# Patient Record
Sex: FEMALE | Race: WHITE | NOT HISPANIC OR LATINO | Employment: OTHER | URBAN - NONMETROPOLITAN AREA
[De-identification: names, ages, dates, MRNs, and addresses within clinical notes are randomized per-mention and may not be internally consistent; named-entity substitution may affect disease eponyms.]

---

## 2017-01-01 ENCOUNTER — OFFICE VISIT (OUTPATIENT)
Dept: MEDICAL GROUP | Facility: PHYSICIAN GROUP | Age: 82
End: 2017-01-01
Payer: MEDICARE

## 2017-01-01 ENCOUNTER — TELEPHONE (OUTPATIENT)
Dept: MEDICAL GROUP | Facility: PHYSICIAN GROUP | Age: 82
End: 2017-01-01

## 2017-01-01 ENCOUNTER — APPOINTMENT (OUTPATIENT)
Dept: RADIOLOGY | Facility: MEDICAL CENTER | Age: 82
End: 2017-01-01
Attending: EMERGENCY MEDICINE
Payer: MEDICARE

## 2017-01-01 ENCOUNTER — HOSPITAL ENCOUNTER (EMERGENCY)
Facility: MEDICAL CENTER | Age: 82
End: 2017-07-11
Attending: EMERGENCY MEDICINE
Payer: MEDICARE

## 2017-01-01 ENCOUNTER — HOSPITAL ENCOUNTER (OUTPATIENT)
Facility: MEDICAL CENTER | Age: 82
End: 2017-07-09
Attending: NURSE PRACTITIONER
Payer: MEDICARE

## 2017-01-01 ENCOUNTER — HOSPITAL ENCOUNTER (OUTPATIENT)
Dept: LAB | Facility: MEDICAL CENTER | Age: 82
End: 2017-02-21
Attending: INTERNAL MEDICINE
Payer: MEDICARE

## 2017-01-01 ENCOUNTER — HOSPITAL ENCOUNTER (INPATIENT)
Facility: MEDICAL CENTER | Age: 82
LOS: 4 days | DRG: 177 | End: 2017-05-13
Attending: EMERGENCY MEDICINE | Admitting: HOSPITALIST
Payer: MEDICARE

## 2017-01-01 ENCOUNTER — APPOINTMENT (OUTPATIENT)
Dept: RADIOLOGY | Facility: MEDICAL CENTER | Age: 82
DRG: 177 | End: 2017-01-01
Attending: EMERGENCY MEDICINE
Payer: MEDICARE

## 2017-01-01 ENCOUNTER — RESOLUTE PROFESSIONAL BILLING HOSPITAL PROF FEE (OUTPATIENT)
Dept: HOSPITALIST | Facility: MEDICAL CENTER | Age: 82
End: 2017-01-01
Payer: MEDICARE

## 2017-01-01 VITALS
DIASTOLIC BLOOD PRESSURE: 78 MMHG | TEMPERATURE: 99.1 F | HEART RATE: 101 BPM | RESPIRATION RATE: 14 BRPM | BODY MASS INDEX: 19.56 KG/M2 | SYSTOLIC BLOOD PRESSURE: 110 MMHG | WEIGHT: 97 LBS | OXYGEN SATURATION: 91 % | HEIGHT: 59 IN

## 2017-01-01 VITALS
HEART RATE: 98 BPM | DIASTOLIC BLOOD PRESSURE: 62 MMHG | BODY MASS INDEX: 19.15 KG/M2 | OXYGEN SATURATION: 98 % | TEMPERATURE: 97.3 F | HEIGHT: 59 IN | SYSTOLIC BLOOD PRESSURE: 102 MMHG | RESPIRATION RATE: 20 BRPM | WEIGHT: 95 LBS

## 2017-01-01 VITALS
WEIGHT: 94 LBS | HEART RATE: 92 BPM | HEIGHT: 59 IN | TEMPERATURE: 97.9 F | SYSTOLIC BLOOD PRESSURE: 126 MMHG | BODY MASS INDEX: 18.95 KG/M2 | OXYGEN SATURATION: 96 % | RESPIRATION RATE: 12 BRPM | DIASTOLIC BLOOD PRESSURE: 68 MMHG

## 2017-01-01 VITALS
SYSTOLIC BLOOD PRESSURE: 116 MMHG | WEIGHT: 80 LBS | DIASTOLIC BLOOD PRESSURE: 68 MMHG | BODY MASS INDEX: 16.13 KG/M2 | OXYGEN SATURATION: 94 % | RESPIRATION RATE: 19 BRPM | HEART RATE: 82 BPM | TEMPERATURE: 98 F | HEIGHT: 59 IN

## 2017-01-01 VITALS
BODY MASS INDEX: 19.56 KG/M2 | HEART RATE: 100 BPM | RESPIRATION RATE: 16 BRPM | TEMPERATURE: 97.9 F | WEIGHT: 97 LBS | HEIGHT: 59 IN | OXYGEN SATURATION: 95 %

## 2017-01-01 VITALS
HEART RATE: 91 BPM | HEIGHT: 59 IN | TEMPERATURE: 98.1 F | OXYGEN SATURATION: 99 % | DIASTOLIC BLOOD PRESSURE: 46 MMHG | RESPIRATION RATE: 13 BRPM | WEIGHT: 94 LBS | BODY MASS INDEX: 18.95 KG/M2 | SYSTOLIC BLOOD PRESSURE: 133 MMHG

## 2017-01-01 VITALS
SYSTOLIC BLOOD PRESSURE: 118 MMHG | DIASTOLIC BLOOD PRESSURE: 78 MMHG | TEMPERATURE: 97.8 F | OXYGEN SATURATION: 97 % | HEART RATE: 78 BPM | HEIGHT: 59 IN | BODY MASS INDEX: 19.64 KG/M2 | WEIGHT: 97.4 LBS

## 2017-01-01 DIAGNOSIS — J18.9 PNEUMONIA OF RIGHT UPPER LOBE DUE TO INFECTIOUS ORGANISM: ICD-10-CM

## 2017-01-01 DIAGNOSIS — D50.0 ANEMIA DUE TO CHRONIC BLOOD LOSS: ICD-10-CM

## 2017-01-01 DIAGNOSIS — G20.C PARKINSONIAN TREMOR: ICD-10-CM

## 2017-01-01 DIAGNOSIS — F03.91 DEMENTIA WITH BEHAVIORAL DISTURBANCE, UNSPECIFIED DEMENTIA TYPE: ICD-10-CM

## 2017-01-01 DIAGNOSIS — R45.1 AGITATION: ICD-10-CM

## 2017-01-01 DIAGNOSIS — J96.11 CHRONIC RESPIRATORY FAILURE WITH HYPOXIA (HCC): ICD-10-CM

## 2017-01-01 DIAGNOSIS — E78.00 PURE HYPERCHOLESTEROLEMIA: ICD-10-CM

## 2017-01-01 DIAGNOSIS — F02.80 ALZHEIMER'S DEMENTIA WITHOUT BEHAVIORAL DISTURBANCE, UNSPECIFIED TIMING OF DEMENTIA ONSET: ICD-10-CM

## 2017-01-01 DIAGNOSIS — R19.7 DIARRHEA, UNSPECIFIED TYPE: ICD-10-CM
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
## 2017-01-01 DIAGNOSIS — F03.90 DEMENTIA WITHOUT BEHAVIORAL DISTURBANCE, UNSPECIFIED DEMENTIA TYPE: ICD-10-CM

## 2017-01-01 DIAGNOSIS — K59.01 SLOW TRANSIT CONSTIPATION: ICD-10-CM

## 2017-01-01 DIAGNOSIS — M54.50 ACUTE MIDLINE LOW BACK PAIN WITHOUT SCIATICA: ICD-10-CM

## 2017-01-01 DIAGNOSIS — E86.0 DEHYDRATION: ICD-10-CM

## 2017-01-01 DIAGNOSIS — A04.72 C. DIFFICILE DIARRHEA: ICD-10-CM

## 2017-01-01 DIAGNOSIS — F41.9 ANXIETY: ICD-10-CM

## 2017-01-01 DIAGNOSIS — R30.0 DYSURIA: ICD-10-CM

## 2017-01-01 DIAGNOSIS — G20.A1 PARKINSON DISEASE: ICD-10-CM

## 2017-01-01 DIAGNOSIS — N28.9 RENAL INSUFFICIENCY: ICD-10-CM

## 2017-01-01 DIAGNOSIS — I10 ESSENTIAL HYPERTENSION: ICD-10-CM

## 2017-01-01 DIAGNOSIS — E78.5 DYSLIPIDEMIA: ICD-10-CM

## 2017-01-01 DIAGNOSIS — M79.641 PAIN OF RIGHT HAND: ICD-10-CM

## 2017-01-01 DIAGNOSIS — F51.01 PRIMARY INSOMNIA: ICD-10-CM

## 2017-01-01 DIAGNOSIS — R63.4 WEIGHT LOSS: ICD-10-CM

## 2017-01-01 DIAGNOSIS — G30.9 ALZHEIMER'S DEMENTIA WITHOUT BEHAVIORAL DISTURBANCE, UNSPECIFIED TIMING OF DEMENTIA ONSET: ICD-10-CM

## 2017-01-01 LAB
ALBUMIN SERPL BCP-MCNC: 2.8 G/DL (ref 3.2–4.9)
ALBUMIN SERPL BCP-MCNC: 2.8 G/DL (ref 3.2–4.9)
ALBUMIN SERPL BCP-MCNC: 3.7 G/DL (ref 3.2–4.9)
ALBUMIN/GLOB SERPL: 0.9 G/DL
ALBUMIN/GLOB SERPL: 1.1 G/DL
ALP SERPL-CCNC: 116 U/L (ref 30–99)
ALP SERPL-CCNC: 79 U/L (ref 30–99)
ALT SERPL-CCNC: 10 U/L (ref 2–50)
ALT SERPL-CCNC: 13 U/L (ref 2–50)
AMORPH CRY #/AREA URNS HPF: PRESENT /HPF
ANION GAP SERPL CALC-SCNC: 12 MMOL/L (ref 0–11.9)
ANION GAP SERPL CALC-SCNC: 7 MMOL/L (ref 0–11.9)
ANION GAP SERPL CALC-SCNC: 8 MMOL/L (ref 0–11.9)
ANION GAP SERPL CALC-SCNC: 9 MMOL/L (ref 0–11.9)
ANISOCYTOSIS BLD QL SMEAR: ABNORMAL
APPEARANCE UR: ABNORMAL
APTT PPP: 28.7 SEC (ref 24.7–36)
AST SERPL-CCNC: 12 U/L (ref 12–45)
AST SERPL-CCNC: 14 U/L (ref 12–45)
BACTERIA #/AREA URNS HPF: ABNORMAL /HPF
BACTERIA BLD CULT: NORMAL
BACTERIA BLD CULT: NORMAL
BACTERIA STL CULT: NORMAL
BACTERIA UR CULT: ABNORMAL
BACTERIA UR CULT: ABNORMAL
BASOPHILS # BLD AUTO: 0.07 K/UL (ref 0–0.12)
BASOPHILS # BLD AUTO: 0.2 % (ref 0–1.8)
BASOPHILS # BLD AUTO: 0.3 % (ref 0–1.8)
BASOPHILS # BLD AUTO: 0.4 % (ref 0–1.8)
BASOPHILS # BLD AUTO: 1.7 % (ref 0–1.8)
BASOPHILS # BLD: 0.02 K/UL (ref 0–0.12)
BASOPHILS # BLD: 0.02 K/UL (ref 0–0.12)
BASOPHILS # BLD: 0.03 K/UL (ref 0–0.12)
BASOPHILS # BLD: 0.1 K/UL (ref 0–0.12)
BASOPHILS NFR BLD AUTO: 1 % (ref 0–1.8)
BILIRUB SERPL-MCNC: 0.2 MG/DL (ref 0.1–1.5)
BILIRUB SERPL-MCNC: 0.4 MG/DL (ref 0.1–1.5)
BILIRUB UR QL STRIP.AUTO: NEGATIVE
BNP SERPL-MCNC: 43 PG/ML (ref 0–100)
BUN SERPL-MCNC: 17 MG/DL (ref 8–22)
BUN SERPL-MCNC: 31 MG/DL (ref 8–22)
BUN SERPL-MCNC: 41 MG/DL (ref 8–22)
BUN SERPL-MCNC: 43 MG/DL (ref 8–22)
BUN SERPL-MCNC: 74 MG/DL (ref 8–22)
C DIFF DNA SPEC QL NAA+PROBE: POSITIVE
C DIFF DNA SPEC QL NAA+PROBE: POSITIVE
C DIFF TOX A+B STL QL IA: POSITIVE
C DIFF TOX A+B STL QL IA: POSITIVE
C DIFF TOX GENS STL QL NAA+PROBE: ABNORMAL
C DIFF TOX GENS STL QL NAA+PROBE: ABNORMAL
CALCIUM SERPL-MCNC: 8.3 MG/DL (ref 8.5–10.5)
CALCIUM SERPL-MCNC: 9.1 MG/DL (ref 8.5–10.5)
CALCIUM SERPL-MCNC: 9.5 MG/DL (ref 8.5–10.5)
CALCIUM SERPL-MCNC: 9.7 MG/DL (ref 8.5–10.5)
CALCIUM SERPL-MCNC: 9.9 MG/DL (ref 8.5–10.5)
CHLORIDE SERPL-SCNC: 102 MMOL/L (ref 96–112)
CHLORIDE SERPL-SCNC: 104 MMOL/L (ref 96–112)
CHLORIDE SERPL-SCNC: 105 MMOL/L (ref 96–112)
CHLORIDE SERPL-SCNC: 116 MMOL/L (ref 96–112)
CHLORIDE SERPL-SCNC: 117 MMOL/L (ref 96–112)
CO2 SERPL-SCNC: 17 MMOL/L (ref 20–33)
CO2 SERPL-SCNC: 20 MMOL/L (ref 20–33)
CO2 SERPL-SCNC: 21 MMOL/L (ref 20–33)
CO2 SERPL-SCNC: 23 MMOL/L (ref 20–33)
CO2 SERPL-SCNC: 26 MMOL/L (ref 20–33)
COLOR UR: YELLOW
CREAT SERPL-MCNC: 0.77 MG/DL (ref 0.5–1.4)
CREAT SERPL-MCNC: 1.09 MG/DL (ref 0.5–1.4)
CREAT SERPL-MCNC: 1.2 MG/DL (ref 0.5–1.4)
CREAT SERPL-MCNC: 1.39 MG/DL (ref 0.5–1.4)
CREAT SERPL-MCNC: 2.72 MG/DL (ref 0.5–1.4)
CULTURE IF INDICATED INDCX: YES UA CULTURE
E COLI SXT1+2 STL IA: NORMAL
E COLI SXT1+2 STL IA: NORMAL
EKG IMPRESSION: NORMAL
EOSINOPHIL # BLD AUTO: 0.01 K/UL (ref 0–0.51)
EOSINOPHIL # BLD AUTO: 0.05 K/UL (ref 0–0.51)
EOSINOPHIL # BLD AUTO: 0.15 K/UL (ref 0–0.51)
EOSINOPHIL # BLD AUTO: 0.19 K/UL (ref 0–0.51)
EOSINOPHIL # BLD: 0.27 K/UL (ref 0–0.51)
EOSINOPHIL NFR BLD AUTO: 3.9 % (ref 0–6.9)
EOSINOPHIL NFR BLD: 0.1 % (ref 0–6.9)
EOSINOPHIL NFR BLD: 0.9 % (ref 0–6.9)
EOSINOPHIL NFR BLD: 1.6 % (ref 0–6.9)
EOSINOPHIL NFR BLD: 4.2 % (ref 0–6.9)
ERYTHROCYTE [DISTWIDTH] IN BLOOD BY AUTOMATED COUNT: 45.9 FL (ref 35.9–50)
ERYTHROCYTE [DISTWIDTH] IN BLOOD BY AUTOMATED COUNT: 55.1 FL (ref 35.9–50)
ERYTHROCYTE [DISTWIDTH] IN BLOOD BY AUTOMATED COUNT: 55.9 FL (ref 35.9–50)
ERYTHROCYTE [DISTWIDTH] IN BLOOD BY AUTOMATED COUNT: 58.4 FL (ref 35.9–50)
ERYTHROCYTE [DISTWIDTH] IN BLOOD BY AUTOMATED COUNT: 61.6 FL (ref 35.9–50)
GFR SERPL CREATININE-BSD FRML MDRD: 17 ML/MIN/1.73 M 2
GFR SERPL CREATININE-BSD FRML MDRD: 37 ML/MIN/1.73 M 2
GFR SERPL CREATININE-BSD FRML MDRD: 48 ML/MIN/1.73 M 2
GFR SERPL CREATININE-BSD FRML MDRD: >60 ML/MIN/1.73 M 2
GLOBULIN SER CALC-MCNC: 3 G/DL (ref 1.9–3.5)
GLOBULIN SER CALC-MCNC: 3.5 G/DL (ref 1.9–3.5)
GLUCOSE SERPL-MCNC: 162 MG/DL (ref 65–99)
GLUCOSE SERPL-MCNC: 164 MG/DL (ref 65–99)
GLUCOSE SERPL-MCNC: 76 MG/DL (ref 65–99)
GLUCOSE SERPL-MCNC: 91 MG/DL (ref 65–99)
GLUCOSE SERPL-MCNC: 95 MG/DL (ref 65–99)
GLUCOSE UR STRIP.AUTO-MCNC: NEGATIVE MG/DL
HCT VFR BLD AUTO: 25.8 % (ref 37–47)
HCT VFR BLD AUTO: 27.4 % (ref 37–47)
HCT VFR BLD AUTO: 29.6 % (ref 37–47)
HCT VFR BLD AUTO: 31.4 % (ref 37–47)
HCT VFR BLD AUTO: 36.1 % (ref 37–47)
HGB BLD-MCNC: 10.1 G/DL (ref 12–16)
HGB BLD-MCNC: 11 G/DL (ref 12–16)
HGB BLD-MCNC: 7.7 G/DL (ref 12–16)
HGB BLD-MCNC: 8.2 G/DL (ref 12–16)
HGB BLD-MCNC: 9.2 G/DL (ref 12–16)
HYALINE CASTS #/AREA URNS LPF: ABNORMAL /LPF
IMM GRANULOCYTES # BLD AUTO: 0.01 K/UL (ref 0–0.11)
IMM GRANULOCYTES # BLD AUTO: 0.02 K/UL (ref 0–0.11)
IMM GRANULOCYTES # BLD AUTO: 0.02 K/UL (ref 0–0.11)
IMM GRANULOCYTES # BLD AUTO: 0.16 K/UL (ref 0–0.11)
IMM GRANULOCYTES NFR BLD AUTO: 0.1 % (ref 0–0.9)
IMM GRANULOCYTES NFR BLD AUTO: 0.3 % (ref 0–0.9)
IMM GRANULOCYTES NFR BLD AUTO: 0.4 % (ref 0–0.9)
IMM GRANULOCYTES NFR BLD AUTO: 1.7 % (ref 0–0.9)
INR PPP: 1.1 (ref 0.87–1.13)
KETONES UR STRIP.AUTO-MCNC: NEGATIVE MG/DL
LACTATE BLD-SCNC: 1.3 MMOL/L (ref 0.5–2)
LEUKOCYTE ESTERASE UR QL STRIP.AUTO: ABNORMAL
LYMPHOCYTES # BLD AUTO: 0.55 K/UL (ref 1–4.8)
LYMPHOCYTES # BLD AUTO: 0.75 K/UL (ref 1–4.8)
LYMPHOCYTES # BLD AUTO: 0.89 K/UL (ref 1–4.8)
LYMPHOCYTES # BLD AUTO: 1.7 K/UL (ref 1–4.8)
LYMPHOCYTES # BLD: 1.96 K/UL (ref 1–4.8)
LYMPHOCYTES NFR BLD AUTO: 28.5 % (ref 22–41)
LYMPHOCYTES NFR BLD: 18.5 % (ref 22–41)
LYMPHOCYTES NFR BLD: 19.6 % (ref 22–41)
LYMPHOCYTES NFR BLD: 8.3 % (ref 22–41)
LYMPHOCYTES NFR BLD: 9.6 % (ref 22–41)
MANUAL DIFF BLD: NORMAL
MCH RBC QN AUTO: 27.4 PG (ref 27–33)
MCH RBC QN AUTO: 29.3 PG (ref 27–33)
MCH RBC QN AUTO: 29.5 PG (ref 27–33)
MCH RBC QN AUTO: 29.9 PG (ref 27–33)
MCH RBC QN AUTO: 30.3 PG (ref 27–33)
MCHC RBC AUTO-ENTMCNC: 29.8 G/DL (ref 33.6–35)
MCHC RBC AUTO-ENTMCNC: 29.9 G/DL (ref 33.6–35)
MCHC RBC AUTO-ENTMCNC: 30.5 G/DL (ref 33.6–35)
MCHC RBC AUTO-ENTMCNC: 31.1 G/DL (ref 33.6–35)
MCHC RBC AUTO-ENTMCNC: 32.2 G/DL (ref 33.6–35)
MCV RBC AUTO: 100 FL (ref 81.4–97.8)
MCV RBC AUTO: 90 FL (ref 81.4–97.8)
MCV RBC AUTO: 94.3 FL (ref 81.4–97.8)
MCV RBC AUTO: 94.3 FL (ref 81.4–97.8)
MCV RBC AUTO: 98.9 FL (ref 81.4–97.8)
MICRO URNS: ABNORMAL
MICROCYTES BLD QL SMEAR: ABNORMAL
MONOCYTES # BLD AUTO: 0.1 K/UL (ref 0–0.85)
MONOCYTES # BLD AUTO: 0.45 K/UL (ref 0–0.85)
MONOCYTES # BLD AUTO: 0.51 K/UL (ref 0–0.85)
MONOCYTES # BLD AUTO: 0.62 K/UL (ref 0–0.85)
MONOCYTES # BLD: 0.45 K/UL (ref 0–0.85)
MONOCYTES NFR BLD AUTO: 1.7 % (ref 0–13.4)
MONOCYTES NFR BLD AUTO: 11.2 % (ref 0–13.4)
MONOCYTES NFR BLD AUTO: 4.9 % (ref 0–13.4)
MONOCYTES NFR BLD AUTO: 6.5 % (ref 0–13.4)
MONOCYTES NFR BLD AUTO: 6.9 % (ref 0–13.4)
MORPHOLOGY BLD-IMP: NORMAL
NEUTROPHILS # BLD AUTO: 2.91 K/UL (ref 2–7.15)
NEUTROPHILS # BLD AUTO: 4.91 K/UL (ref 2–7.15)
NEUTROPHILS # BLD AUTO: 6.72 K/UL (ref 2–7.15)
NEUTROPHILS # BLD AUTO: 7.58 K/UL (ref 2–7.15)
NEUTROPHILS # BLD: 4.11 K/UL (ref 2–7.15)
NEUTROPHILS NFR BLD AUTO: 59.8 % (ref 44–72)
NEUTROPHILS NFR BLD: 64.2 % (ref 44–72)
NEUTROPHILS NFR BLD: 73.1 % (ref 44–72)
NEUTROPHILS NFR BLD: 84.3 % (ref 44–72)
NEUTROPHILS NFR BLD: 85.2 % (ref 44–72)
NEUTS BAND NFR BLD MANUAL: 0.9 % (ref 0–10)
NITRITE UR QL STRIP.AUTO: NEGATIVE
NRBC # BLD AUTO: 0 K/UL
NRBC BLD AUTO-RTO: 0 /100 WBC
NRBC BLD-RTO: 0 /100 WBC
OVALOCYTES BLD QL SMEAR: NORMAL
PH UR STRIP.AUTO: 5.5 [PH]
PHOSPHATE SERPL-MCNC: 2.9 MG/DL (ref 2.5–4.5)
PLATELET # BLD AUTO: 328 K/UL (ref 164–446)
PLATELET # BLD AUTO: 341 K/UL (ref 164–446)
PLATELET # BLD AUTO: 392 K/UL (ref 164–446)
PLATELET # BLD AUTO: 455 K/UL (ref 164–446)
PLATELET # BLD AUTO: 486 K/UL (ref 164–446)
PLATELET BLD QL SMEAR: NORMAL
PMV BLD AUTO: 8.9 FL (ref 9–12.9)
PMV BLD AUTO: 9.1 FL (ref 9–12.9)
PMV BLD AUTO: 9.1 FL (ref 9–12.9)
PMV BLD AUTO: 9.2 FL (ref 9–12.9)
PMV BLD AUTO: 9.9 FL (ref 9–12.9)
POIKILOCYTOSIS BLD QL SMEAR: NORMAL
POTASSIUM SERPL-SCNC: 3.8 MMOL/L (ref 3.6–5.5)
POTASSIUM SERPL-SCNC: 4.1 MMOL/L (ref 3.6–5.5)
POTASSIUM SERPL-SCNC: 4.3 MMOL/L (ref 3.6–5.5)
POTASSIUM SERPL-SCNC: 4.8 MMOL/L (ref 3.6–5.5)
POTASSIUM SERPL-SCNC: 5.3 MMOL/L (ref 3.6–5.5)
PROT SERPL-MCNC: 5.8 G/DL (ref 6–8.2)
PROT SERPL-MCNC: 7.2 G/DL (ref 6–8.2)
PROT UR QL STRIP: NEGATIVE MG/DL
PROTHROMBIN TIME: 14.6 SEC (ref 12–14.6)
RBC # BLD AUTO: 2.61 M/UL (ref 4.2–5.4)
RBC # BLD AUTO: 2.74 M/UL (ref 4.2–5.4)
RBC # BLD AUTO: 3.14 M/UL (ref 4.2–5.4)
RBC # BLD AUTO: 3.33 M/UL (ref 4.2–5.4)
RBC # BLD AUTO: 4.01 M/UL (ref 4.2–5.4)
RBC BLD AUTO: PRESENT
RBC UR QL AUTO: NEGATIVE
SIGNIFICANT IND 70042: ABNORMAL
SIGNIFICANT IND 70042: NORMAL
SITE SITE: ABNORMAL
SITE SITE: NORMAL
SODIUM SERPL-SCNC: 134 MMOL/L (ref 135–145)
SODIUM SERPL-SCNC: 138 MMOL/L (ref 135–145)
SODIUM SERPL-SCNC: 138 MMOL/L (ref 135–145)
SODIUM SERPL-SCNC: 143 MMOL/L (ref 135–145)
SODIUM SERPL-SCNC: 145 MMOL/L (ref 135–145)
SOURCE SOURCE: ABNORMAL
SOURCE SOURCE: NORMAL
SP GR UR STRIP.AUTO: 1.01
T4 FREE SERPL-MCNC: 1.06 NG/DL (ref 0.53–1.43)
TROPONIN I SERPL-MCNC: <0.01 NG/ML (ref 0–0.04)
TSH SERPL DL<=0.005 MIU/L-ACNC: 4.05 UIU/ML (ref 0.3–3.7)
WBC # BLD AUTO: 4.5 K/UL (ref 4.8–10.8)
WBC # BLD AUTO: 5.7 K/UL (ref 4.8–10.8)
WBC # BLD AUTO: 6.9 K/UL (ref 4.8–10.8)
WBC # BLD AUTO: 9 K/UL (ref 4.8–10.8)
WBC # BLD AUTO: 9.2 K/UL (ref 4.8–10.8)
WBC #/AREA URNS HPF: ABNORMAL /HPF

## 2017-01-01 PROCEDURE — 304562 HCHG STAT O2 MASK/CANNULA

## 2017-01-01 PROCEDURE — G8484 FLU IMMUNIZE NO ADMIN: HCPCS | Performed by: NURSE PRACTITIONER

## 2017-01-01 PROCEDURE — G8419 CALC BMI OUT NRM PARAM NOF/U: HCPCS | Performed by: NURSE PRACTITIONER

## 2017-01-01 PROCEDURE — 71010 DX-CHEST-PORTABLE (1 VIEW): CPT

## 2017-01-01 PROCEDURE — 770006 HCHG ROOM/CARE - MED/SURG/GYN SEMI*

## 2017-01-01 PROCEDURE — 700102 HCHG RX REV CODE 250 W/ 637 OVERRIDE(OP): Performed by: HOSPITALIST

## 2017-01-01 PROCEDURE — 87186 SC STD MICRODIL/AGAR DIL: CPT

## 2017-01-01 PROCEDURE — 99214 OFFICE O/P EST MOD 30 MIN: CPT | Performed by: NURSE PRACTITIONER

## 2017-01-01 PROCEDURE — 700111 HCHG RX REV CODE 636 W/ 250 OVERRIDE (IP): Performed by: INTERNAL MEDICINE

## 2017-01-01 PROCEDURE — 1036F TOBACCO NON-USER: CPT | Performed by: NURSE PRACTITIONER

## 2017-01-01 PROCEDURE — A9270 NON-COVERED ITEM OR SERVICE: HCPCS | Performed by: HOSPITALIST

## 2017-01-01 PROCEDURE — 85025 COMPLETE CBC W/AUTO DIFF WBC: CPT

## 2017-01-01 PROCEDURE — 99239 HOSP IP/OBS DSCHRG MGMT >30: CPT | Performed by: INTERNAL MEDICINE

## 2017-01-01 PROCEDURE — 87077 CULTURE AEROBIC IDENTIFY: CPT

## 2017-01-01 PROCEDURE — 4040F PNEUMOC VAC/ADMIN/RCVD: CPT | Performed by: INTERNAL MEDICINE

## 2017-01-01 PROCEDURE — 85610 PROTHROMBIN TIME: CPT

## 2017-01-01 PROCEDURE — 700105 HCHG RX REV CODE 258: Performed by: INTERNAL MEDICINE

## 2017-01-01 PROCEDURE — 99285 EMERGENCY DEPT VISIT HI MDM: CPT

## 2017-01-01 PROCEDURE — 87493 C DIFF AMPLIFIED PROBE: CPT

## 2017-01-01 PROCEDURE — G8419 CALC BMI OUT NRM PARAM NOF/U: HCPCS | Performed by: INTERNAL MEDICINE

## 2017-01-01 PROCEDURE — G8997 SWALLOW GOAL STATUS: HCPCS | Mod: CH

## 2017-01-01 PROCEDURE — 36415 COLL VENOUS BLD VENIPUNCTURE: CPT

## 2017-01-01 PROCEDURE — 1101F PT FALLS ASSESS-DOCD LE1/YR: CPT | Performed by: INTERNAL MEDICINE

## 2017-01-01 PROCEDURE — 700111 HCHG RX REV CODE 636 W/ 250 OVERRIDE (IP): Performed by: HOSPITALIST

## 2017-01-01 PROCEDURE — 700105 HCHG RX REV CODE 258: Performed by: HOSPITALIST

## 2017-01-01 PROCEDURE — 87045 FECES CULTURE AEROBIC BACT: CPT

## 2017-01-01 PROCEDURE — 700105 HCHG RX REV CODE 258: Performed by: EMERGENCY MEDICINE

## 2017-01-01 PROCEDURE — 99497 ADVNCD CARE PLAN 30 MIN: CPT | Performed by: INTERNAL MEDICINE

## 2017-01-01 PROCEDURE — G8484 FLU IMMUNIZE NO ADMIN: HCPCS | Performed by: INTERNAL MEDICINE

## 2017-01-01 PROCEDURE — 99214 OFFICE O/P EST MOD 30 MIN: CPT | Performed by: FAMILY MEDICINE

## 2017-01-01 PROCEDURE — 92610 EVALUATE SWALLOWING FUNCTION: CPT

## 2017-01-01 PROCEDURE — 87046 STOOL CULTR AEROBIC BACT EA: CPT

## 2017-01-01 PROCEDURE — 4040F PNEUMOC VAC/ADMIN/RCVD: CPT | Performed by: NURSE PRACTITIONER

## 2017-01-01 PROCEDURE — 99356 PR PROLONGED SVC I/P OR OBS SETTING 1ST HOUR: CPT | Performed by: INTERNAL MEDICINE

## 2017-01-01 PROCEDURE — 99214 OFFICE O/P EST MOD 30 MIN: CPT | Performed by: INTERNAL MEDICINE

## 2017-01-01 PROCEDURE — 87040 BLOOD CULTURE FOR BACTERIA: CPT

## 2017-01-01 PROCEDURE — 80048 BASIC METABOLIC PNL TOTAL CA: CPT

## 2017-01-01 PROCEDURE — G8996 SWALLOW CURRENT STATUS: HCPCS | Mod: CL

## 2017-01-01 PROCEDURE — 99233 SBSQ HOSP IP/OBS HIGH 50: CPT | Performed by: INTERNAL MEDICINE

## 2017-01-01 PROCEDURE — 96361 HYDRATE IV INFUSION ADD-ON: CPT

## 2017-01-01 PROCEDURE — 1101F PT FALLS ASSESS-DOCD LE1/YR: CPT | Performed by: NURSE PRACTITIONER

## 2017-01-01 PROCEDURE — 99213 OFFICE O/P EST LOW 20 MIN: CPT | Performed by: NURSE PRACTITIONER

## 2017-01-01 PROCEDURE — G8432 DEP SCR NOT DOC, RNG: HCPCS | Performed by: NURSE PRACTITIONER

## 2017-01-01 PROCEDURE — A9270 NON-COVERED ITEM OR SERVICE: HCPCS | Performed by: EMERGENCY MEDICINE

## 2017-01-01 PROCEDURE — 700111 HCHG RX REV CODE 636 W/ 250 OVERRIDE (IP): Performed by: EMERGENCY MEDICINE

## 2017-01-01 PROCEDURE — 80069 RENAL FUNCTION PANEL: CPT

## 2017-01-01 PROCEDURE — 87086 URINE CULTURE/COLONY COUNT: CPT

## 2017-01-01 PROCEDURE — 72100 X-RAY EXAM L-S SPINE 2/3 VWS: CPT

## 2017-01-01 PROCEDURE — 85730 THROMBOPLASTIN TIME PARTIAL: CPT

## 2017-01-01 PROCEDURE — 81001 URINALYSIS AUTO W/SCOPE: CPT

## 2017-01-01 PROCEDURE — 1036F TOBACCO NON-USER: CPT | Performed by: FAMILY MEDICINE

## 2017-01-01 PROCEDURE — 99215 OFFICE O/P EST HI 40 MIN: CPT | Performed by: INTERNAL MEDICINE

## 2017-01-01 PROCEDURE — 80053 COMPREHEN METABOLIC PANEL: CPT

## 2017-01-01 PROCEDURE — 84484 ASSAY OF TROPONIN QUANT: CPT

## 2017-01-01 PROCEDURE — 83880 ASSAY OF NATRIURETIC PEPTIDE: CPT

## 2017-01-01 PROCEDURE — 96360 HYDRATION IV INFUSION INIT: CPT

## 2017-01-01 PROCEDURE — G8420 CALC BMI NORM PARAMETERS: HCPCS | Performed by: FAMILY MEDICINE

## 2017-01-01 PROCEDURE — 99223 1ST HOSP IP/OBS HIGH 75: CPT | Mod: AI | Performed by: HOSPITALIST

## 2017-01-01 PROCEDURE — 92526 ORAL FUNCTION THERAPY: CPT

## 2017-01-01 PROCEDURE — 84439 ASSAY OF FREE THYROXINE: CPT

## 2017-01-01 PROCEDURE — 85027 COMPLETE CBC AUTOMATED: CPT

## 2017-01-01 PROCEDURE — 1101F PT FALLS ASSESS-DOCD LE1/YR: CPT | Performed by: FAMILY MEDICINE

## 2017-01-01 PROCEDURE — 93005 ELECTROCARDIOGRAM TRACING: CPT | Performed by: EMERGENCY MEDICINE

## 2017-01-01 PROCEDURE — G8432 DEP SCR NOT DOC, RNG: HCPCS | Performed by: FAMILY MEDICINE

## 2017-01-01 PROCEDURE — 83605 ASSAY OF LACTIC ACID: CPT

## 2017-01-01 PROCEDURE — 700102 HCHG RX REV CODE 250 W/ 637 OVERRIDE(OP): Performed by: EMERGENCY MEDICINE

## 2017-01-01 PROCEDURE — 1036F TOBACCO NON-USER: CPT | Performed by: INTERNAL MEDICINE

## 2017-01-01 PROCEDURE — G8432 DEP SCR NOT DOC, RNG: HCPCS | Performed by: INTERNAL MEDICINE

## 2017-01-01 PROCEDURE — 87324 CLOSTRIDIUM AG IA: CPT

## 2017-01-01 PROCEDURE — 96375 TX/PRO/DX INJ NEW DRUG ADDON: CPT

## 2017-01-01 PROCEDURE — 4040F PNEUMOC VAC/ADMIN/RCVD: CPT | Performed by: FAMILY MEDICINE

## 2017-01-01 PROCEDURE — 99232 SBSQ HOSP IP/OBS MODERATE 35: CPT | Performed by: INTERNAL MEDICINE

## 2017-01-01 PROCEDURE — 96365 THER/PROPH/DIAG IV INF INIT: CPT

## 2017-01-01 PROCEDURE — 85007 BL SMEAR W/DIFF WBC COUNT: CPT

## 2017-01-01 PROCEDURE — 84443 ASSAY THYROID STIM HORMONE: CPT

## 2017-01-01 PROCEDURE — 87899 AGENT NOS ASSAY W/OPTIC: CPT

## 2017-01-01 PROCEDURE — 73502 X-RAY EXAM HIP UNI 2-3 VIEWS: CPT | Mod: RT

## 2017-01-01 RX ORDER — MIRTAZAPINE 15 MG/1
15 TABLET, FILM COATED ORAL
Qty: 30 TAB | Refills: 11 | Status: SHIPPED | OUTPATIENT
Start: 2017-01-01

## 2017-01-01 RX ORDER — HALOPERIDOL 2 MG/ML
1 SOLUTION ORAL
COMMUNITY

## 2017-01-01 RX ORDER — DONEPEZIL HYDROCHLORIDE 10 MG/1
TABLET, FILM COATED ORAL
COMMUNITY
Start: 2017-01-01 | End: 2017-01-01 | Stop reason: SDUPTHER

## 2017-01-01 RX ORDER — DOCUSATE SODIUM 100 MG/1
100 CAPSULE, LIQUID FILLED ORAL 2 TIMES DAILY
Qty: 60 CAP | Refills: 3 | Status: SHIPPED | OUTPATIENT
Start: 2017-01-01

## 2017-01-01 RX ORDER — AZITHROMYCIN 250 MG/1
250 TABLET, FILM COATED ORAL DAILY
Qty: 3 TAB | Refills: 0 | Status: SHIPPED | OUTPATIENT
Start: 2017-01-01 | End: 2017-01-01

## 2017-01-01 RX ORDER — TRAZODONE HYDROCHLORIDE 50 MG/1
50 TABLET ORAL NIGHTLY
COMMUNITY

## 2017-01-01 RX ORDER — DONEPEZIL HYDROCHLORIDE 10 MG/1
TABLET, FILM COATED ORAL
Qty: 90 TAB | Refills: 3 | Status: ON HOLD | OUTPATIENT
Start: 2017-01-01 | End: 2017-01-01

## 2017-01-01 RX ORDER — LOVASTATIN 20 MG/1
40 TABLET ORAL
Status: DISCONTINUED | OUTPATIENT
Start: 2017-01-01 | End: 2017-01-01

## 2017-01-01 RX ORDER — LORAZEPAM 0.5 MG/1
0.5 TABLET ORAL 3 TIMES DAILY PRN
COMMUNITY

## 2017-01-01 RX ORDER — SODIUM CHLORIDE 9 MG/ML
1000 INJECTION, SOLUTION INTRAVENOUS ONCE
Status: COMPLETED | OUTPATIENT
Start: 2017-01-01 | End: 2017-01-01

## 2017-01-01 RX ORDER — LORAZEPAM 0.5 MG/1
0.5 TABLET ORAL EVERY 8 HOURS PRN
Qty: 30 TAB | Refills: 0 | Status: SHIPPED | OUTPATIENT
Start: 2017-01-01 | End: 2017-01-01 | Stop reason: SDUPTHER

## 2017-01-01 RX ORDER — POLYETHYLENE GLYCOL 3350 17 G/17G
1 POWDER, FOR SOLUTION ORAL
Status: DISCONTINUED | OUTPATIENT
Start: 2017-01-01 | End: 2017-01-01 | Stop reason: HOSPADM

## 2017-01-01 RX ORDER — ZINC OXIDE 216 MG/ML
1 LOTION TOPICAL
Qty: 90 CAN | Refills: 11 | Status: SHIPPED | OUTPATIENT
Start: 2017-01-01 | End: 2017-01-01

## 2017-01-01 RX ORDER — AZITHROMYCIN 250 MG/1
500 TABLET, FILM COATED ORAL ONCE
Status: ACTIVE | OUTPATIENT
Start: 2017-01-01 | End: 2017-01-01

## 2017-01-01 RX ORDER — SULFAMETHOXAZOLE AND TRIMETHOPRIM 800; 160 MG/1; MG/1
1 TABLET ORAL 2 TIMES DAILY
Qty: 20 TAB | Refills: 0 | Status: SHIPPED | OUTPATIENT
Start: 2017-01-01 | End: 2017-01-01

## 2017-01-01 RX ORDER — BISACODYL 10 MG
10 SUPPOSITORY, RECTAL RECTAL
Status: DISCONTINUED | OUTPATIENT
Start: 2017-01-01 | End: 2017-01-01 | Stop reason: HOSPADM

## 2017-01-01 RX ORDER — HEPARIN SODIUM 5000 [USP'U]/ML
5000 INJECTION, SOLUTION INTRAVENOUS; SUBCUTANEOUS EVERY 8 HOURS
Status: DISCONTINUED | OUTPATIENT
Start: 2017-01-01 | End: 2017-01-01

## 2017-01-01 RX ORDER — ONDANSETRON 4 MG/1
4 TABLET, ORALLY DISINTEGRATING ORAL EVERY 8 HOURS PRN
Qty: 10 TAB | Refills: 0 | Status: SHIPPED | OUTPATIENT
Start: 2017-01-01

## 2017-01-01 RX ORDER — IBUPROFEN 200 MG
200 TABLET ORAL EVERY 6 HOURS PRN
Status: ON HOLD | COMMUNITY
End: 2017-01-01

## 2017-01-01 RX ORDER — FERROUS SULFATE 325(65) MG
325 TABLET ORAL DAILY
Status: ON HOLD | COMMUNITY
End: 2017-01-01

## 2017-01-01 RX ORDER — DEXTROSE MONOHYDRATE 25 G/50ML
25 INJECTION, SOLUTION INTRAVENOUS
Status: DISCONTINUED | OUTPATIENT
Start: 2017-01-01 | End: 2017-01-01 | Stop reason: HOSPADM

## 2017-01-01 RX ORDER — LISINOPRIL AND HYDROCHLOROTHIAZIDE 20; 12.5 MG/1; MG/1
TABLET ORAL
Qty: 90 TAB | Refills: 3 | Status: ON HOLD | OUTPATIENT
Start: 2017-01-01 | End: 2017-01-01

## 2017-01-01 RX ORDER — SODIUM CHLORIDE 9 MG/ML
INJECTION, SOLUTION INTRAVENOUS CONTINUOUS
Status: DISCONTINUED | OUTPATIENT
Start: 2017-01-01 | End: 2017-01-01

## 2017-01-01 RX ORDER — SODIUM CHLORIDE 9 MG/ML
30 INJECTION, SOLUTION INTRAVENOUS ONCE
Status: COMPLETED | OUTPATIENT
Start: 2017-01-01 | End: 2017-01-01

## 2017-01-01 RX ORDER — MIRTAZAPINE 15 MG/1
15 TABLET, FILM COATED ORAL
Status: DISCONTINUED | OUTPATIENT
Start: 2017-01-01 | End: 2017-01-01 | Stop reason: HOSPADM

## 2017-01-01 RX ORDER — DONEPEZIL HYDROCHLORIDE 5 MG/1
10 TABLET, FILM COATED ORAL NIGHTLY
Status: DISCONTINUED | OUTPATIENT
Start: 2017-01-01 | End: 2017-01-01 | Stop reason: HOSPADM

## 2017-01-01 RX ORDER — ONDANSETRON 4 MG/1
4 TABLET, ORALLY DISINTEGRATING ORAL EVERY 4 HOURS PRN
Status: DISCONTINUED | OUTPATIENT
Start: 2017-01-01 | End: 2017-01-01 | Stop reason: HOSPADM

## 2017-01-01 RX ORDER — LOPERAMIDE HYDROCHLORIDE 2 MG/1
2 CAPSULE ORAL 4 TIMES DAILY PRN
Qty: 30 CAP | Refills: 0 | Status: SHIPPED | OUTPATIENT
Start: 2017-01-01

## 2017-01-01 RX ORDER — LORAZEPAM 0.5 MG/1
0.5 TABLET ORAL EVERY 8 HOURS PRN
Start: 2017-01-01

## 2017-01-01 RX ORDER — AMPICILLIN AND SULBACTAM 2; 1 G/1; G/1
3 INJECTION, POWDER, FOR SOLUTION INTRAMUSCULAR; INTRAVENOUS ONCE
Status: COMPLETED | OUTPATIENT
Start: 2017-01-01 | End: 2017-01-01

## 2017-01-01 RX ORDER — AMOXICILLIN 250 MG
2 CAPSULE ORAL 2 TIMES DAILY
Status: DISCONTINUED | OUTPATIENT
Start: 2017-01-01 | End: 2017-01-01 | Stop reason: HOSPADM

## 2017-01-01 RX ORDER — HALOPERIDOL 2 MG/ML
0.25 SOLUTION ORAL 3 TIMES DAILY PRN
COMMUNITY

## 2017-01-01 RX ORDER — LORAZEPAM 0.5 MG/1
0.5 TABLET ORAL 3 TIMES DAILY PRN
Status: DISCONTINUED | OUTPATIENT
Start: 2017-01-01 | End: 2017-01-01 | Stop reason: HOSPADM

## 2017-01-01 RX ORDER — LORAZEPAM 0.5 MG/1
0.5 TABLET ORAL EVERY 8 HOURS PRN
Qty: 30 TAB | Refills: 0 | Status: SHIPPED
Start: 2017-01-01 | End: 2017-01-01 | Stop reason: SDUPTHER

## 2017-01-01 RX ORDER — METRONIDAZOLE 500 MG/1
500 TABLET ORAL ONCE
Status: COMPLETED | OUTPATIENT
Start: 2017-01-01 | End: 2017-01-01

## 2017-01-01 RX ORDER — METRONIDAZOLE 500 MG/1
500 TABLET ORAL 3 TIMES DAILY
Qty: 42 TAB | Refills: 0 | Status: SHIPPED | OUTPATIENT
Start: 2017-01-01 | End: 2017-07-24

## 2017-01-01 RX ORDER — ASPIRIN 325 MG
325 TABLET ORAL DAILY
Status: DISCONTINUED | OUTPATIENT
Start: 2017-01-01 | End: 2017-01-01

## 2017-01-01 RX ORDER — FERROUS SULFATE 325(65) MG
325 TABLET ORAL DAILY
Status: DISCONTINUED | OUTPATIENT
Start: 2017-01-01 | End: 2017-01-01

## 2017-01-01 RX ORDER — AZITHROMYCIN 250 MG/1
250 TABLET, FILM COATED ORAL DAILY
Status: DISCONTINUED | OUTPATIENT
Start: 2017-01-01 | End: 2017-01-01 | Stop reason: HOSPADM

## 2017-01-01 RX ORDER — CITALOPRAM 20 MG/1
20 TABLET ORAL EVERY MORNING
Status: ON HOLD | COMMUNITY
End: 2017-01-01

## 2017-01-01 RX ORDER — CITALOPRAM 20 MG/1
20 TABLET ORAL EVERY MORNING
Status: DISCONTINUED | OUTPATIENT
Start: 2017-01-01 | End: 2017-01-01

## 2017-01-01 RX ORDER — AMOXICILLIN AND CLAVULANATE POTASSIUM 875; 125 MG/1; MG/1
1 TABLET, FILM COATED ORAL 2 TIMES DAILY
Qty: 8 TAB | Refills: 0 | Status: SHIPPED | OUTPATIENT
Start: 2017-01-01 | End: 2017-01-01

## 2017-01-01 RX ORDER — LOVASTATIN 40 MG/1
TABLET ORAL
Qty: 90 TAB | Refills: 3 | Status: ON HOLD | OUTPATIENT
Start: 2017-01-01 | End: 2017-01-01

## 2017-01-01 RX ORDER — LORAZEPAM 0.5 MG/1
0.5 TABLET ORAL 3 TIMES DAILY
Qty: 90 TAB | Refills: 2 | Status: SHIPPED | OUTPATIENT
Start: 2017-01-01 | End: 2017-01-01

## 2017-01-01 RX ORDER — DONEPEZIL HYDROCHLORIDE 10 MG/1
5 TABLET, FILM COATED ORAL DAILY
Qty: 30 TAB | Refills: 1 | Status: SHIPPED | OUTPATIENT
Start: 2017-01-01

## 2017-01-01 RX ORDER — TRAZODONE HYDROCHLORIDE 50 MG/1
50 TABLET ORAL
Status: ON HOLD | COMMUNITY
End: 2017-01-01

## 2017-01-01 RX ORDER — CITALOPRAM 20 MG/1
20 TABLET ORAL DAILY
Qty: 90 TAB | Refills: 3 | Status: SHIPPED | OUTPATIENT
Start: 2017-01-01 | End: 2017-01-01

## 2017-01-01 RX ORDER — ACETAMINOPHEN 325 MG/1
650 TABLET ORAL EVERY 6 HOURS PRN
Qty: 30 TAB | Refills: 0 | Status: SHIPPED | OUTPATIENT
Start: 2017-01-01

## 2017-01-01 RX ORDER — LABETALOL HYDROCHLORIDE 5 MG/ML
10 INJECTION, SOLUTION INTRAVENOUS EVERY 4 HOURS PRN
Status: DISCONTINUED | OUTPATIENT
Start: 2017-01-01 | End: 2017-01-01 | Stop reason: HOSPADM

## 2017-01-01 RX ORDER — ONDANSETRON 2 MG/ML
4 INJECTION INTRAMUSCULAR; INTRAVENOUS EVERY 4 HOURS PRN
Status: DISCONTINUED | OUTPATIENT
Start: 2017-01-01 | End: 2017-01-01 | Stop reason: HOSPADM

## 2017-01-01 RX ORDER — POLYETHYLENE GLYCOL 3350 17 G/17G
17 POWDER, FOR SOLUTION ORAL DAILY
Qty: 1 BOTTLE | Refills: 3 | Status: SHIPPED
Start: 2017-01-01 | End: 2017-01-01

## 2017-01-01 RX ORDER — AZITHROMYCIN 500 MG/1
500 INJECTION, POWDER, LYOPHILIZED, FOR SOLUTION INTRAVENOUS ONCE
Status: DISCONTINUED | OUTPATIENT
Start: 2017-01-01 | End: 2017-01-01

## 2017-01-01 RX ORDER — SENNOSIDES A AND B 8.6 MG/1
8.6 TABLET, FILM COATED ORAL
COMMUNITY
End: 2017-01-01

## 2017-01-01 RX ADMIN — SODIUM CHLORIDE 1000 ML: 9 INJECTION, SOLUTION INTRAVENOUS at 21:00

## 2017-01-01 RX ADMIN — CEFTRIAXONE SODIUM 2 G: 2 INJECTION, POWDER, FOR SOLUTION INTRAMUSCULAR; INTRAVENOUS at 18:30

## 2017-01-01 RX ADMIN — DONEPEZIL HYDROCHLORIDE 10 MG: 5 TABLET, FILM COATED ORAL at 20:04

## 2017-01-01 RX ADMIN — MIRTAZAPINE 15 MG: 15 TABLET, FILM COATED ORAL at 20:10

## 2017-01-01 RX ADMIN — LORAZEPAM 0.5 MG: 0.5 TABLET ORAL at 11:36

## 2017-01-01 RX ADMIN — AZITHROMYCIN 250 MG: 250 TABLET, FILM COATED ORAL at 09:29

## 2017-01-01 RX ADMIN — SODIUM CHLORIDE 1000 ML: 9 INJECTION, SOLUTION INTRAVENOUS at 08:00

## 2017-01-01 RX ADMIN — FERROUS SULFATE TAB 325 MG (65 MG ELEMENTAL FE) 325 MG: 325 (65 FE) TAB at 13:00

## 2017-01-01 RX ADMIN — MIRTAZAPINE 15 MG: 15 TABLET, FILM COATED ORAL at 20:04

## 2017-01-01 RX ADMIN — FERROUS SULFATE TAB 325 MG (65 MG ELEMENTAL FE) 325 MG: 325 (65 FE) TAB at 09:29

## 2017-01-01 RX ADMIN — LORAZEPAM 0.5 MG: 0.5 TABLET ORAL at 05:52

## 2017-01-01 RX ADMIN — AZITHROMYCIN 250 MG: 250 TABLET, FILM COATED ORAL at 07:59

## 2017-01-01 RX ADMIN — AZITHROMYCIN 250 MG: 250 TABLET, FILM COATED ORAL at 12:59

## 2017-01-01 RX ADMIN — HEPARIN SODIUM 5000 UNITS: 5000 INJECTION, SOLUTION INTRAVENOUS; SUBCUTANEOUS at 21:08

## 2017-01-01 RX ADMIN — SODIUM CHLORIDE: 9 INJECTION, SOLUTION INTRAVENOUS at 14:55

## 2017-01-01 RX ADMIN — CEFTRIAXONE SODIUM 2 G: 2 INJECTION, POWDER, FOR SOLUTION INTRAMUSCULAR; INTRAVENOUS at 09:31

## 2017-01-01 RX ADMIN — METRONIDAZOLE 500 MG: 500 TABLET ORAL at 23:41

## 2017-01-01 RX ADMIN — STANDARDIZED SENNA CONCENTRATE AND DOCUSATE SODIUM 2 TABLET: 8.6; 5 TABLET, FILM COATED ORAL at 20:04

## 2017-01-01 RX ADMIN — SODIUM CHLORIDE 1089 ML: 9 INJECTION, SOLUTION INTRAVENOUS at 09:45

## 2017-01-01 RX ADMIN — LOVASTATIN 40 MG: 20 TABLET ORAL at 21:13

## 2017-01-01 RX ADMIN — HEPARIN SODIUM 5000 UNITS: 5000 INJECTION, SOLUTION INTRAVENOUS; SUBCUTANEOUS at 05:33

## 2017-01-01 RX ADMIN — AMPICILLIN SODIUM AND SULBACTAM SODIUM 3 G: 2; 1 INJECTION, POWDER, FOR SOLUTION INTRAMUSCULAR; INTRAVENOUS at 10:24

## 2017-01-01 RX ADMIN — SODIUM CHLORIDE: 9 INJECTION, SOLUTION INTRAVENOUS at 12:06

## 2017-01-01 RX ADMIN — STANDARDIZED SENNA CONCENTRATE AND DOCUSATE SODIUM 2 TABLET: 8.6; 5 TABLET, FILM COATED ORAL at 20:10

## 2017-01-01 RX ADMIN — DONEPEZIL HYDROCHLORIDE 10 MG: 5 TABLET, FILM COATED ORAL at 20:10

## 2017-01-01 RX ADMIN — HEPARIN SODIUM 5000 UNITS: 5000 INJECTION, SOLUTION INTRAVENOUS; SUBCUTANEOUS at 05:38

## 2017-01-01 RX ADMIN — STANDARDIZED SENNA CONCENTRATE AND DOCUSATE SODIUM 2 TABLET: 8.6; 5 TABLET, FILM COATED ORAL at 21:13

## 2017-01-01 RX ADMIN — SODIUM CHLORIDE: 9 INJECTION, SOLUTION INTRAVENOUS at 16:08

## 2017-01-01 RX ADMIN — CEFTRIAXONE SODIUM 2 G: 2 INJECTION, POWDER, FOR SOLUTION INTRAMUSCULAR; INTRAVENOUS at 07:59

## 2017-01-01 RX ADMIN — HEPARIN SODIUM 5000 UNITS: 5000 INJECTION, SOLUTION INTRAVENOUS; SUBCUTANEOUS at 21:13

## 2017-01-01 RX ADMIN — STANDARDIZED SENNA CONCENTRATE AND DOCUSATE SODIUM 2 TABLET: 8.6; 5 TABLET, FILM COATED ORAL at 09:29

## 2017-01-01 RX ADMIN — DONEPEZIL HYDROCHLORIDE 10 MG: 5 TABLET, FILM COATED ORAL at 21:13

## 2017-01-01 RX ADMIN — STANDARDIZED SENNA CONCENTRATE AND DOCUSATE SODIUM 2 TABLET: 8.6; 5 TABLET, FILM COATED ORAL at 07:59

## 2017-01-01 RX ADMIN — AMPICILLIN SODIUM AND SULBACTAM SODIUM 3 G: 2; 1 INJECTION, POWDER, FOR SOLUTION INTRAMUSCULAR; INTRAVENOUS at 10:59

## 2017-01-01 RX ADMIN — HEPARIN SODIUM 5000 UNITS: 5000 INJECTION, SOLUTION INTRAVENOUS; SUBCUTANEOUS at 14:45

## 2017-01-01 RX ADMIN — ONDANSETRON 4 MG: 2 INJECTION INTRAMUSCULAR; INTRAVENOUS at 15:18

## 2017-01-01 RX ADMIN — MIRTAZAPINE 15 MG: 15 TABLET, FILM COATED ORAL at 21:13

## 2017-01-01 ASSESSMENT — ENCOUNTER SYMPTOMS
MYALGIAS: 0
BACK PAIN: 0
FALLS: 0
FOCAL WEAKNESS: 0
SORE THROAT: 0
BLOOD IN STOOL: 0
HEARTBURN: 0
MEMORY LOSS: 1
SHORTNESS OF BREATH: 0
DIARRHEA: 0
DIARRHEA: 0
VOMITING: 0
PALPITATIONS: 0
BACK PAIN: 0
FOCAL WEAKNESS: 0
PALPITATIONS: 0
HEADACHES: 0
MYALGIAS: 0
NAUSEA: 0
HALLUCINATIONS: 0
HEADACHES: 0
COUGH: 0
FALLS: 0
MEMORY LOSS: 1
DIZZINESS: 0
CHILLS: 0
CHILLS: 0
FALLS: 1
SORE THROAT: 0
HALLUCINATIONS: 0
WEAKNESS: 1
COUGH: 1
DIARRHEA: 1
SHORTNESS OF BREATH: 0
HEADACHES: 0
MEMORY LOSS: 1
FEVER: 0
FOCAL WEAKNESS: 0
ABDOMINAL PAIN: 0
DEPRESSION: 0
BACK PAIN: 0
MYALGIAS: 0
COUGH: 1
DIZZINESS: 0
BLOOD IN STOOL: 0
NAUSEA: 0
ABDOMINAL PAIN: 0
DEPRESSION: 0
SHORTNESS OF BREATH: 0
ABDOMINAL PAIN: 0
DEPRESSION: 0
SORE THROAT: 0
FEVER: 0
PALPITATIONS: 0
DIZZINESS: 0
FALLS: 0
ABDOMINAL PAIN: 0
NAUSEA: 0
WEAKNESS: 1
BLOOD IN STOOL: 0
CHILLS: 0
VOMITING: 0
FEVER: 0
VOMITING: 0
HALLUCINATIONS: 0
WEAKNESS: 1
BACK PAIN: 0
DIARRHEA: 0

## 2017-01-01 ASSESSMENT — COPD QUESTIONNAIRES
DURING THE PAST 4 WEEKS HOW MUCH DID YOU FEEL SHORT OF BREATH: SOME OF THE TIME
COPD SCREENING SCORE: 7
DO YOU EVER COUGH UP ANY MUCUS OR PHLEGM?: YES, A FEW DAYS A WEEK OR MONTH
HAVE YOU SMOKED AT LEAST 100 CIGARETTES IN YOUR ENTIRE LIFE: YES

## 2017-01-01 ASSESSMENT — LIFESTYLE VARIABLES
DO YOU DRINK ALCOHOL: NO
EVER_SMOKED: YES
DO YOU DRINK ALCOHOL: NO
DO YOU DRINK ALCOHOL: NO
EVER_SMOKED: YES

## 2017-01-01 ASSESSMENT — PAIN SCALES - GENERAL
PAINLEVEL_OUTOF10: 0
PAINLEVEL_OUTOF10: 5

## 2017-01-01 ASSESSMENT — PATIENT HEALTH QUESTIONNAIRE - PHQ9: CLINICAL INTERPRETATION OF PHQ2 SCORE: 2

## 2017-01-17 PROBLEM — R19.7 DIARRHEA: Status: ACTIVE | Noted: 2017-01-01

## 2017-01-17 PROBLEM — R63.4 WEIGHT LOSS: Status: ACTIVE | Noted: 2017-01-01

## 2017-01-17 NOTE — ASSESSMENT & PLAN NOTE
This is a chronic condition which is well controlled on medications. Patient is tolerating medications without side effects.

## 2017-01-17 NOTE — MR AVS SNAPSHOT
"Alison Atkinson   2017 10:00 AM   Office Visit   MRN: [id number]    Department:  [facility]   Dept Phone:  [phone]    Description:  Female : 1935   Provider:  Beba Granado M.D.           Reason for Visit     Loss of Appetite not eating      Allergies as of 2017     Allergen Noted Reactions    Other Drug 2013       ALL NARCOTICS       You were diagnosed with     Parkinson disease (CMS-HCC)   [141657]   Uncontrolled, not treating with medication at this time    Primary insomnia   [290141]   Uncontrolled, changing to Remeron    Dysuria   [788.1.ICD-9-CM]   Uncontrolled, plan was to treat for UTI but patient will go to the emergency room    Dementia with behavioral disturbance, unspecified dementia type   [5920663]   Uncontrolled, continues on Aricept    Essential hypertension   [2194005]   Controlled, on medication    Dyslipidemia   [707523]       Diarrhea, unspecified type   [5950253]   Uncontrolled, sending to the ER    Weight loss   [841447]   Uncontrolled, adding mirtazapine      Vital Signs     Blood Pressure Pulse Temperature Respirations Height Weight    102/62 mmHg 98 36.3 °C (97.3 °F) 20 1.499 m (4' 11.02\") 43.092 kg (95 lb)    Body Mass Index Oxygen Saturation Smoking Status             19.18 kg/m2 98% Former Smoker         Basic Information     Date Of Birth Sex Race Ethnicity Preferred Language    1935 Female Unknown Unknown English      Your appointments     2017 11:00 AM   Established Patient with Beba Granado M.D.   [address] [address] [phone]           You will be receiving a confirmation call a few days before your appointment from our automated call confirmation system.              Problem List              ICD-10-CM Priority Class Noted - Resolved    Hypertension I10   2013 - Present    OSTEOPOROSIS    2013 - Present    Dyslipidemia E78.5   2013 - " Present    Vitamin D deficiency disease E55.9   6/18/2013 - Present    Dementia F03.90   10/24/2013 - Present    DNR (do not resuscitate) Z66   4/3/2014 - Present    Anxiety F41.9   1/22/2015 - Present    Agitation R45.1   7/7/2016 - Present    Parkinson disease (CMS-HCC) G20   7/7/2016 - Present    Parkinsonian tremor (CMS-HCC) G20   7/7/2016 - Present    Diarrhea R19.7   1/17/2017 - Present    Weight loss R63.4   1/17/2017 - Present      Health Maintenance        Date Due Completion Dates    IMM DTaP/Tdap/Td Vaccine (1 - Tdap) 12/20/1956 ---    PAP SMEAR 12/20/1958 ---    MAMMOGRAM 12/20/1977 ---    COLONOSCOPY 12/20/1987 ---    IMM ZOSTER VACCINE 12/20/1997 ---    IMM INFLUENZA (1) 9/1/2016 11/25/2015, 11/26/2014    IMM PNEUMOCOCCAL 65+ (ADULT) LOW/MEDIUM RISK SERIES (2 of 2 - PPSV23) 1/25/2017 1/25/2016    BONE DENSITY 6/26/2018 6/26/2013            Current Immunizations     13-VALENT PCV PREVNAR 1/25/2016  2:45 PM    Influenza TIV (IM) 11/25/2015, 11/26/2014    Tuberculin Skin Test 12/6/2016, 12/22/2015, 12/15/2015, 1/22/2015, 10/14/2014, 2/27/2014 10:45 AM, 2/20/2014  1:30 PM      Below and/or attached are the medications your provider expects you to take. Review all of your home medications and newly ordered medications with your provider and/or pharmacist. Follow medication instructions as directed by your provider and/or pharmacist. Please keep your medication list with you and share with your provider. Update the information when medications are discontinued, doses are changed, or new medications (including over-the-counter products) are added; and carry medication information at all times in the event of emergency situations     Allergies:  OTHER DRUG - (reactions not documented)               Medications  Valid as of: January 17, 2017 - 10:54 AM    Generic Name Brand Name Tablet Size Instructions for use    Aspirin (Tab)  MG Take 1 Tab by mouth every day.        Calcium Carb-Cholecalciferol   Take   by mouth every day.        Calcium Carbonate-Vitamin D (Cap) Calcium-Vitamin D 600-200 MG-UNIT Take 1 Cap by mouth every day.        Cholecalciferol (Cap) Vitamin D 2000 UNITS Take 1 Cap by mouth every day.        Citalopram Hydrobromide (Tab) CELEXA 40 MG TAKE 1 TABLET BY MOUTH DAILY        Donepezil HCl (Tab) ARICEPT 10 MG TAKE 1 TABLET BY MOUTH DAILY        Lisinopril-Hydrochlorothiazide (Tab) PRINZIDE, ZESTORETIC 20-12.5 MG TAKE 1 TABLET BY MOUTH DAILY        Lovastatin (Tab) MEVACOR 40 MG TAKE 1 TABLET BY MOUTH NIGHTLY AT BEDTIME        Mirtazapine (Tab) REMERON 15 MG Take 1 Tab by mouth every bedtime.        Multiple Vitamins-Minerals (Tab) OCUVITE  Take 1 Tab by mouth every day.        Potassium Chloride Crystal CR (Tab CR) K-DUR 20 MEQ Take 1 Tab by mouth every day.        QUEtiapine Fumarate (Tab) SEROQUEL 25 MG Take 1 Tab by mouth 2 times a day.        Sulfamethoxazole-Trimethoprim (Tab) BACTRIM -160 MG Take 1 Tab by mouth 2 times a day.        .                 Medicines prescribed today were sent to:     [facility] [place] [facility] - [address]    [address] [place] [id number]    Phone: [phone] Fax: [phone]    Open 24 Hours?: No      Medication refill instructions:       If your prescription bottle indicates you have medication refills left, it is not necessary to call your provider’s office. Please contact your pharmacy and they will refill your medication.    If your prescription bottle indicates you do not have any refills left, you may request refills at any time through one of the following ways: The online [employer] system (except Urgent Care), by calling your provider’s office, or by asking your pharmacy to contact your provider’s office with a refill request. Medication refills are processed only during regular business hours and may not be available until the next business day. Your provider may request additional information or to have a follow-up visit with you prior to  refilling your medication.   *Please Note: Medication refills are assigned a new Rx number when refilled electronically. Your pharmacy may indicate that no refills were authorized even though a new prescription for the same medication is available at the pharmacy. Please request the medicine by name with the pharmacy before contacting your provider for a refill.        Instructions    1. Stop seroquel at night.    2. Instead try mirtazepine 15mg at night.    3. Give urine sample today and treat with bactrim 1 tab twice per day x 10 days.    4. Nutritional shake with every meal.          MyChart Status: Patient Declined

## 2017-01-17 NOTE — PATIENT INSTRUCTIONS
1. Stop seroquel at night.    2. Instead try mirtazepine 15mg at night.    3. Give urine sample today and treat with bactrim 1 tab twice per day x 10 days.    4. Nutritional shake with every meal.

## 2017-01-17 NOTE — ASSESSMENT & PLAN NOTE
Patient is a 81-year-old female severe dementia who comes in today with her nurse from her assisted living facility. She has had weight loss over the past few months. She initially had wisdom teeth removed but continues to have weight loss. She has seemed depressed in the past few weeks. We did discuss stopping her Seroquel and trying her on mirtazapine instead at night to see if that would prompt hunger.     She has also had decreased urine output and some suprapubic tenderness on exam today. We discussed initially treating her for UTI empirically and also trying to get a sample. Patient during her initial visit was pleasant during her exam and did not seem unwell.    As the patient left her room today to go give us a urine sample, she had diarrhea in the bathroom. Then the nurse notes that she became very pale and said she could not walk anymore. She slumped to the floor and was put in a wheelchair. Patient did not lose consciousness but seemed much more somnolent than her initial visit a few minutes before. Patient then began to complain of abdominal pain. Her nurse notes that she has not had diarrhea, nausea or vomiting or fevers at her assisted living. Due to this change in her mentation, and also our inability to get a good history from this patient, I'm recommended she go to the emergency room for further assessment.

## 2017-01-17 NOTE — PROGRESS NOTES
Chief Complaint   Patient presents with   • Loss of Appetite     not eating       HISTORY OF PRESENT ILLNESS: Patient is a 81 y.o. female established patient who presents today to be seen for physical exam.    Weight loss  Patient is a 81-year-old female severe dementia who comes in today with her nurse from her assisted living facility. She has had weight loss over the past few months. She initially had wisdom teeth removed but continues to have weight loss. She has seemed depressed in the past few weeks. We did discuss stopping her Seroquel and trying her on mirtazapine instead at night to see if that would prompt hunger.     She has also had decreased urine output and some suprapubic tenderness on exam today. We discussed initially treating her for UTI empirically and also trying to get a sample. Patient during her initial visit was pleasant during her exam and did not seem unwell.    As the patient left her room today to go give us a urine sample, she had diarrhea in the bathroom. Then the nurse notes that she became very pale and said she could not walk anymore. She slumped to the floor and was put in a wheelchair. Patient did not lose consciousness but seemed much more somnolent than her initial visit a few minutes before. Patient then began to complain of abdominal pain. Her nurse notes that she has not had diarrhea, nausea or vomiting or fevers at her assisted living. Due to this change in her mentation, and also our inability to get a good history from this patient, I'm recommended she go to the emergency room for further assessment.    Parkinson disease (CMS-[facility])  Patient was diagnosed with Parkinson's disease this year based on tremor. We attempted her on Sinemet but unfortunately she did not tolerate medication.    Diarrhea  As noted, patient had episode of diarrhea in the clinic. Based on clinical symptoms, I think she needs to be evaluated in the emergency room.    Dementia  Patient does have end-stage  dementia and has few words. She is not able to answer most questions. She continues on Aricept.    Hypertension  This is a chronic condition which is well controlled on medications. Patient is tolerating medications without side effects.    Dyslipidemia  This is a chronic condition which is well controlled on medications. Patient is tolerating medications without side effects.      Patient Active Problem List    Diagnosis Date Noted   • Diarrhea 01/17/2017   • Weight loss 01/17/2017   • Agitation 07/07/2016   • Parkinson disease (CMS-HCC) 07/07/2016   • Parkinsonian tremor (CMS-HCC) 07/07/2016   • Anxiety 01/22/2015   • DNR (do not resuscitate) 04/03/2014   • Dementia 10/24/2013   • Dyslipidemia 06/18/2013   • Vitamin D deficiency disease 06/18/2013   • Hypertension 06/11/2013   • OSTEOPOROSIS 06/11/2013       Allergies:Other drug    Current Outpatient Prescriptions Ordered in [facility]   Medication Sig Dispense Refill   • mirtazapine (REMERON) 15 MG Tab Take 1 Tab by mouth every bedtime. 30 Tab 11   • sulfamethoxazole-trimethoprim (BACTRIM DS) 800-160 MG tablet Take 1 Tab by mouth 2 times a day. 20 Tab 0   • quetiapine (SEROQUEL) 25 MG Tab Take 1 Tab by mouth 2 times a day. 60 Tab 11   • citalopram (CELEXA) 40 MG Tab TAKE 1 TABLET BY MOUTH DAILY 90 Tab 3   • lovastatin (MEVACOR) 40 MG tablet TAKE 1 TABLET BY MOUTH NIGHTLY AT BEDTIME 90 Tab 3   • lisinopril-hydrochlorothiazide (PRINZIDE, ZESTORETIC) 20-12.5 MG per tablet TAKE 1 TABLET BY MOUTH DAILY 90 Tab 3   • donepezil (ARICEPT) 10 MG tablet TAKE 1 TABLET BY MOUTH DAILY 90 Tab 3   • potassium chloride SA (K-DUR) 20 MEQ Tab CR Take 1 Tab by mouth every day. 30 Tab 11   • aspirin (ASA) 325 MG TABS Take 1 Tab by mouth every day. 90 Tab 3   • Multiple Vitamins-Minerals (OCUVITE) TABS Take 1 Tab by mouth every day. 90 Tab 3   • Cholecalciferol (VITAMIN D) 2000 UNITS CAPS Take 1 Cap by mouth every day. 90 Cap 3   • Calcium Carbonate-Vitamin D (CALCIUM-VITAMIN D) 600-200  "MG-UNIT CAPS Take 1 Cap by mouth every day. 90 Cap 3   • Calcium Carbonate-Vitamin D (CALCIUM 600 + D PO) Take  by mouth every day.       No current [facility]-ordered facility-administered medications on file.       Past Medical History   Diagnosis Date   • CATARACT    • Hypertension    • OSTEOPOROSIS        Social History   Substance Use Topics   • Smoking status: Former Smoker     Types: Cigarettes     Quit date: 2006   • Smokeless tobacco: None   • Alcohol Use: No       Family Status   Relation Status Death Age   • Mother     • Father     • Brother Alive    • Brother     • Brother       Family History   Problem Relation Age of Onset   • Other Mother    • Lung Disease Father    • Lung Disease Brother    • Lung Disease Brother        ROS:  Review of Systems   Constitutional: Negative for fever and malaise/fatigue.   HENT: Negative for congestion  Respiratory: Negative for cough  Cardiovascular: Negative for chest pain  Gastrointestinal: Positive for abdominal pain and diarrhea  All other systems reviewed and are negative except as in HPI.      Exam:  Blood pressure 102/62, pulse 98, temperature 36.3 °C (97.3 °F), resp. rate 20, height 1.499 m (4' 11.02\"), weight 43.092 kg (95 lb), SpO2 98 %.  General: Then elderly demented female initially in no distress. Patient became more somnolent after episode of diarrhea  Head is grossly normal.  Neck: Supple without JVD   Pulmonary: Clear to ausculation and percussion.  Normal effort. No rales, ronchi, or wheezing.  Cardiovascular: Regular rate and rhythm without murmur. Carotid and radial pulses are intact and equal bilaterally.  Extremities: no clubbing, cyanosis, or edema.  Abdomen: Soft, mildly tender to palpation in the suprapubic region  Psych: Alert and oriented ×1    Patient will be taken by ambulance to emergency room for further assessment.     Assessment/Plan:  1. Parkinson disease (CMS-HCC)      Uncontrolled, not treating with " medication at this time   2. Primary insomnia  mirtazapine (REMERON) 15 MG Tab    Uncontrolled, changing to Remeron   3. Dysuria  sulfamethoxazole-trimethoprim (BACTRIM DS) 800-160 MG tablet    Uncontrolled, plan was to treat for UTI but patient will go to the emergency room   4. Dementia with behavioral disturbance, unspecified dementia type      Uncontrolled, continues on Aricept   5. Essential hypertension      Controlled, on medication   6. Dyslipidemia     7. Diarrhea, unspecified type      Uncontrolled, sending to the ER   8. Weight loss      Uncontrolled, adding mirtazapine     Please note that this dictation was created using voice recognition software. I have made every reasonable attempt to correct obvious errors, but I expect that there are errors of grammar and possibly content that I did not discover before finalizing the note.

## 2017-01-17 NOTE — ASSESSMENT & PLAN NOTE
As noted, patient had episode of diarrhea in the clinic. Based on clinical symptoms, I think she needs to be evaluated in the emergency room.

## 2017-01-17 NOTE — ASSESSMENT & PLAN NOTE
Patient does have end-stage dementia and has few words. She is not able to answer most questions. She continues on Aricept.

## 2017-01-17 NOTE — ASSESSMENT & PLAN NOTE
Patient was diagnosed with Parkinson's disease this year based on tremor. We attempted her on Sinemet but unfortunately she did not tolerate medication.

## 2017-02-10 PROBLEM — D50.0 ANEMIA DUE TO CHRONIC BLOOD LOSS: Status: ACTIVE | Noted: 2017-01-01

## 2017-02-10 PROBLEM — I27.20 MILD PULMONARY HYPERTENSION (HCC): Status: ACTIVE | Noted: 2017-01-01

## 2017-02-21 PROBLEM — J96.11 CHRONIC RESPIRATORY FAILURE WITH HYPOXIA (HCC): Status: ACTIVE | Noted: 2017-01-01

## 2017-02-21 PROBLEM — K59.01 SLOW TRANSIT CONSTIPATION: Status: ACTIVE | Noted: 2017-01-01

## 2017-02-21 NOTE — ASSESSMENT & PLAN NOTE
Patient is a 81-year-old female I last saw one month ago. At that visit she was doing well until she went to the bathroom and she had a presyncopal event. She was taken by EMS to [facility]. There she was found to have a fecal impaction and anemia. She required a blood transfusion. Patient does have moderate stage dementia. She was discharged home on iron supplementation. Her assisted-living health care worker today notes that the patient is still having hard stools and not having a bowel movement every day. She is complaining of abdominal pain and does not want to eat. We discussed options. Initially I was going to place her on polyethylene glycol but the patient does not drink much fluids. They're trying to encourage her to drink more water. We discussed starting her on Colace 100 mg twice per day. The assisted-living and instructed to let me know if she is not having 1-2 soft bowel movements today. At that point I would add polyethylene glycol.

## 2017-02-21 NOTE — ASSESSMENT & PLAN NOTE
Patient was noted to have a parkinsonian tremor and symptoms consistent with Parkinson's disease last year. I attempted her on Sinemet but unfortunately she had fatigue and mood changes on the medication. Family decided they would like to leave her off.

## 2017-02-21 NOTE — ASSESSMENT & PLAN NOTE
Patient has dementia and does continue on donepezil. She is on Remeron to help with sleep at night. This is a recent addition. Patient also has been on citalopram 40 mg a day to help with mood changes from dementia. We discussed decreasing the dose down to 20 mg a day.

## 2017-02-21 NOTE — ASSESSMENT & PLAN NOTE
Patient was diagnosed with chronic respiratory failure with hypoxia the hospital and sent home on oxygen 2 L continuous. She struggles with remembering to leave the oxygen in place with her dementia. We discussed having her wear it at night and when necessary during the day.

## 2017-02-21 NOTE — ASSESSMENT & PLAN NOTE
Patient was found to be anemic while in the hospital. She was transfused 2 units of blood. She is on iron but having constipation. We discussed decreasing her iron to once a day and rechecking labs.

## 2017-02-21 NOTE — ASSESSMENT & PLAN NOTE
As noted, patient has a parkinsonian tremor in her right hand. Family has decided no treatment at this time.

## 2017-02-21 NOTE — PATIENT INSTRUCTIONS
1. Take docusate 100mg twice per day.    2. Goal is 1-2 soft BMs per day.    3. Decrease iron to 1 time per day.    4. Oxygen at night and PRN during the day.    5. Decrease citalopram to 20mg a day.    6. Recheck our blood work today.    7. Follow up in 3 months.

## 2017-02-21 NOTE — PROGRESS NOTES
Chief Complaint   Patient presents with   • Follow-Up     stool softner, decreased apetite       HISTORY OF PRESENT ILLNESS: Patient is a 81 y.o. female established patient who presents today to be seen for acute and chronic issues.    Slow transit constipation  Patient is a 81-year-old female I last saw one month ago. At that visit she was doing well until she went to the bathroom and she had a presyncopal event. She was taken by EMS to [facility]. There she was found to have a fecal impaction and anemia. She required a blood transfusion. Patient does have moderate stage dementia. She was discharged home on iron supplementation. Her assisted-living health care worker today notes that the patient is still having hard stools and not having a bowel movement every day. She is complaining of abdominal pain and does not want to eat. We discussed options. Initially I was going to place her on polyethylene glycol but the patient does not drink much fluids. They're trying to encourage her to drink more water. We discussed starting her on Colace 100 mg twice per day. The assisted-living and instructed to let me know if she is not having 1-2 soft bowel movements today. At that point I would add polyethylene glycol.    Chronic respiratory failure with hypoxia (CMS-HCC)  Patient was diagnosed with chronic respiratory failure with hypoxia the hospital and sent home on oxygen 2 L continuous. She struggles with remembering to leave the oxygen in place with her dementia. We discussed having her wear it at night and when necessary during the day.    Dementia  Patient has dementia and does continue on donepezil. She is on Remeron to help with sleep at night. This is a recent addition. Patient also has been on citalopram 40 mg a day to help with mood changes from dementia. We discussed decreasing the dose down to 20 mg a day.    Anemia due to chronic blood loss  Patient was found to be anemic while in the hospital. She  was transfused 2 units of blood. She is on iron but having constipation. We discussed decreasing her iron to once a day and rechecking labs.    Hypertension  This is a chronic condition which is well controlled on medications. Patient is tolerating medications without side effects.    Parkinson disease (CMS-HCC)  Patient was noted to have a parkinsonian tremor and symptoms consistent with Parkinson's disease last year. I attempted her on Sinemet but unfortunately she had fatigue and mood changes on the medication. Family decided they would like to leave her off.    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 Family has decided no treatment at this time.      Patient Active Problem List    Diagnosis Date Noted   • Chronic respiratory failure with hypoxia (CMS-HCC) 02/21/2017   • Slow transit constipation 02/21/2017   • Mild pulmonary hypertension (CMS-HCC) 02/10/2017   • Anemia due to chronic blood loss 02/10/2017   • Diarrhea 01/17/2017   • Weight loss 01/17/2017   • Agitation 07/07/2016   • Parkinson disease (CMS-HCC) 07/07/2016   • Parkinsonian tremor (CMS-HCC) 07/07/2016   • Anxiety 01/22/2015   • DNR (do not resuscitate) 04/03/2014   • Dementia 10/24/2013   • Dyslipidemia 06/18/2013   • Vitamin D deficiency disease 06/18/2013   • Hypertension 06/11/2013   • OSTEOPOROSIS 06/11/2013       Allergies:Other drug    Current Outpatient Prescriptions Ordered in [facility]   Medication Sig Dispense Refill   • ferrous sulfate 325 (65 FE) MG tablet Take 325 mg by mouth every day.     • trazodone (DESYREL) 50 MG Tab Take 50 mg by mouth every evening.     • ibuprofen (MOTRIN) 200 MG Tab Take 200 mg by mouth every 6 hours as needed.     • docusate sodium (COLACE) 100 MG Cap Take 1 Cap by mouth 2 times a day. 60 Cap 3   • donepezil (ARICEPT) 10 MG tablet TAKE 1 TABLET BY MOUTH DAILY 90 Tab 3   • lisinopril-hydrochlorothiazide (PRINZIDE, ZESTORETIC) 20-12.5 MG per tablet TAKE 1 TABLET  "BY MOUTH DAILY 90 Tab 3   • lovastatin (MEVACOR) 40 MG tablet TAKE 1 TABLET BY MOUTH NIGHTLY AT BEDTIME 90 Tab 3   • mirtazapine (REMERON) 15 MG Tab Take 1 Tab by mouth every bedtime. 30 Tab 11   • citalopram (CELEXA) 40 MG Tab TAKE 1 TABLET BY MOUTH DAILY 90 Tab 3   • aspirin (ASA) 325 MG TABS Take 1 Tab by mouth every day. 90 Tab 3   • Multiple Vitamins-Minerals (OCUVITE) TABS Take 1 Tab by mouth every day. 90 Tab 3   • Cholecalciferol (VITAMIN D) 2000 UNITS CAPS Take 1 Cap by mouth every day. 90 Cap 3   • Calcium Carbonate-Vitamin D (CALCIUM-VITAMIN D) 600-200 MG-UNIT CAPS Take 1 Cap by mouth every day. 90 Cap 3   • potassium chloride SA (K-DUR) 20 MEQ Tab CR Take 1 Tab by mouth every day. 30 Tab 11   • Calcium Carbonate-Vitamin D (CALCIUM 600 + D PO) Take  by mouth every day.       No current [facility]-ordered facility-administered medications on file.       Past Medical History   Diagnosis Date   • CATARACT    • Hypertension    • OSTEOPOROSIS        Social History   Substance Use Topics   • Smoking status: Former Smoker     Types: Cigarettes     Quit date: 2006   • Smokeless tobacco: None   • Alcohol Use: No       Family Status   Relation Status Death Age   • Mother     • Father     • Brother Alive    • Brother     • Brother       Family History   Problem Relation Age of Onset   • Other Mother    • Lung Disease Father    • Lung Disease Brother    • Lung Disease Brother        ROS:  Review of Systems   Constitutional: Negative for fever and malaise/fatigue.   HENT: Negative for congestion  Respiratory: Negative for cough  Cardiovascular: Negative for chest pain  Gastrointestinal: Positive for constipation  All other systems reviewed and are negative except as in HPI.      Exam:  Blood pressure 118/78, pulse 78, temperature 36.6 °C (97.8 °F), height 1.499 m (4' 11\"), weight 44.18 kg (97 lb 6.4 oz), SpO2 97 %.  General:  Well nourished, well developed elderly female in NAD  Head is " grossly normal.  Neck: Supple without JVD   Pulmonary: Clear to ausculation and percussion.  Normal effort. No rales, ronchi, or wheezing.  Cardiovascular: Regular rate and rhythm without murmur. Carotid and radial pulses are intact and equal bilaterally.  Extremities: no clubbing, cyanosis, or edema.  Abdomen: Soft, nontender to palpation  Psych: Alert and oriented ×1    Hospital records reviewed     Assessment/Plan:  1. Slow transit constipation  docusate sodium (COLACE) 100 MG Cap    Uncontrolled, will start with Colace   2. Anemia due to chronic blood loss  CBC WITH DIFFERENTIAL    Uncontrolled, on iron   3. Chronic respiratory failure with hypoxia (CMS-HCC)      Controlled, using oxygen   4. Dementia with behavioral disturbance, unspecified dementia type      Controlled, medications   5. Parkinson disease (CMS-HCC)      Uncontrolled, medications made symptoms worse   6. Parkinsonian tremor (CMS-HCC)      Uncontrolled, medications made symptoms worse   7. Essential hypertension  BASIC METABOLIC PANEL    Controlled, on medications     Please note that this dictation was created using voice recognition software. I have made every reasonable attempt to correct obvious errors, but I expect that there are errors of grammar and possibly content that I did not discover before finalizing the note.

## 2017-02-21 NOTE — MR AVS SNAPSHOT
"Alison Atkinson   2017 11:00 AM   Office Visit   MRN: [id number]    Department:  [facility]   Dept Phone:  [phone]    Description:  Female : 1935   Provider:  Beba Granado M.D.           Reason for Visit     Follow-Up stool softner, decreased apetite      Allergies as of 2017     Allergen Noted Reactions    Other Drug 2013       ALL NARCOTICS       You were diagnosed with     Slow transit constipation   [564.01.ICD-9-CM]       Anemia due to chronic blood loss   [433886]       Chronic respiratory failure with hypoxia (CMS-HCC)   [747035]       Dementia with behavioral disturbance, unspecified dementia type   [7204790]       Parkinson disease (CMS-HCC)   [224838]       Parkinsonian tremor (CMS-HCC)   [043218]       Essential hypertension   [5627016]         Vital Signs     Blood Pressure Pulse Temperature Height Weight Body Mass Index    118/78 mmHg 78 36.6 °C (97.8 °F) 1.499 m (4' 11\") 44.18 kg (97 lb 6.4 oz) 19.66 kg/m2    Oxygen Saturation Smoking Status                97% Former Smoker          Basic Information     Date Of Birth Sex Race Ethnicity Preferred Language    1935 Female Unknown Unknown English      Problem List              ICD-10-CM Priority Class Noted - Resolved    Hypertension I10   2013 - Present    OSTEOPOROSIS    2013 - Present    Dyslipidemia E78.5   2013 - Present    Vitamin D deficiency disease E55.9   2013 - Present    Dementia F03.90   10/24/2013 - Present    DNR (do not resuscitate) Z66   4/3/2014 - Present    Anxiety F41.9   2015 - Present    Agitation R45.1   2016 - Present    Parkinson disease (CMS-HCC) G20   2016 - Present    Parkinsonian tremor (CMS-HCC) G20   2016 - Present    Diarrhea R19.7   2017 - Present    Weight loss R63.4   2017 - Present    Mild pulmonary hypertension (CMS-HCC) I27.2   2/10/2017 - Present    Anemia due to chronic blood loss D50.0   2/10/2017 - Present   " Chronic respiratory failure with hypoxia (CMS-[facility]) J96.11   2/21/2017 - Present    Slow transit constipation K59.01   2/21/2017 - Present      Health Maintenance        Date Due Completion Dates    IMM DTaP/Tdap/Td Vaccine (1 - Tdap) 12/20/1956 ---    PAP SMEAR 12/20/1958 ---    MAMMOGRAM 12/20/1977 ---    COLONOSCOPY 12/20/1987 ---    IMM ZOSTER VACCINE 12/20/1997 ---    IMM INFLUENZA (1) 9/1/2016 11/25/2015, 11/26/2014    IMM PNEUMOCOCCAL 65+ (ADULT) LOW/MEDIUM RISK SERIES (2 of 2 - PPSV23) 1/25/2017 1/25/2016    BONE DENSITY 6/26/2018 6/26/2013            Current Immunizations     13-VALENT PCV PREVNAR 1/25/2016  2:45 PM    Influenza TIV (IM) 11/25/2015, 11/26/2014    Tuberculin Skin Test 12/6/2016, 12/22/2015, 12/15/2015, 1/22/2015, 10/14/2014, 2/27/2014 10:45 AM, 2/20/2014  1:30 PM      Below and/or attached are the medications your provider expects you to take. Review all of your home medications and newly ordered medications with your provider and/or pharmacist. Follow medication instructions as directed by your provider and/or pharmacist. Please keep your medication list with you and share with your provider. Update the information when medications are discontinued, doses are changed, or new medications (including over-the-counter products) are added; and carry medication information at all times in the event of emergency situations     Allergies:  OTHER DRUG - (reactions not documented)               Medications  Valid as of: February 21, 2017 - 11:18 AM    Generic Name Brand Name Tablet Size Instructions for use    Aspirin (Tab)  MG Take 1 Tab by mouth every day.        Calcium Carb-Cholecalciferol   Take  by mouth every day.        Calcium Carbonate-Vitamin D (Cap) Calcium-Vitamin D 600-200 MG-UNIT Take 1 Cap by mouth every day.        Cholecalciferol (Cap) Vitamin D 2000 UNITS Take 1 Cap by mouth every day.        Citalopram Hydrobromide (Tab) CELEXA 40 MG TAKE 1 TABLET BY MOUTH DAILY         Docusate Sodium (Cap) COLACE 100 MG Take 1 Cap by mouth 2 times a day.        Donepezil HCl (Tab) ARICEPT 10 MG TAKE 1 TABLET BY MOUTH DAILY        Ferrous Sulfate (Tab) ferrous sulfate 325 (65 FE) MG Take 325 mg by mouth every day.        Ibuprofen (Tab) MOTRIN 200 MG Take 200 mg by mouth every 6 hours as needed.        Lisinopril-Hydrochlorothiazide (Tab) PRINZIDE, ZESTORETIC 20-12.5 MG TAKE 1 TABLET BY MOUTH DAILY        Lovastatin (Tab) MEVACOR 40 MG TAKE 1 TABLET BY MOUTH NIGHTLY AT BEDTIME        Mirtazapine (Tab) REMERON 15 MG Take 1 Tab by mouth every bedtime.        Multiple Vitamins-Minerals (Tab) OCUVITE  Take 1 Tab by mouth every day.        Potassium Chloride Crystal CR (Tab CR) Kdur 20 MEQ Take 1 Tab by mouth every day.        TraZODone HCl (Tab) DESYREL 50 MG Take 50 mg by mouth every evening.        .                 Medicines prescribed today were sent to:     [facility] [address]    [address] [place] [id number]    Phone: [phone] Fax: [phone]    Open 24 Hours?: No      Medication refill instructions:       If your prescription bottle indicates you have medication refills left, it is not necessary to call your provider’s office. Please contact your pharmacy and they will refill your medication.    If your prescription bottle indicates you do not have any refills left, you may request refills at any time through one of the following ways: The online [facility] system (except Urgent Care), by calling your provider’s office, or by asking your pharmacy to contact your provider’s office with a refill request. Medication refills are processed only during regular business hours and may not be available until the next business day. Your provider may request additional information or to have a follow-up visit with you prior to refilling your medication.   *Please Note: Medication refills are assigned a new Rx number when refilled electronically. Your pharmacy may indicate that  no refills were authorized even though a new prescription for the same medication is available at the pharmacy. Please request the medicine by name with the pharmacy before contacting your provider for a refill.        Your To Do List     Future Labs/Procedures Complete By Expires    BASIC METABOLIC PANEL  As directed 2/21/2018    CBC WITH DIFFERENTIAL  As directed 2/21/2018      Instructions    1. Take docusate 100mg twice per day.    2. Goal is 1-2 soft BMs per day.    3. Decrease iron to 1 time per day.    4. Oxygen at night and PRN during the day.    5. Decrease citalopram to 20mg a day.    6. Recheck our blood work today.    7. Follow up in 3 months.          MyChart Status: Patient Declined

## 2017-02-24 NOTE — TELEPHONE ENCOUNTER
Was the patient seen in the last year in this department? Yes     Does patient have an active prescription for medications requested? No     Received Request Via: Pharmacy      Pharmacy called saying we need to lower the citalopram down to 20mg

## 2017-03-17 NOTE — ASSESSMENT & PLAN NOTE
81-year-old female patient seen about a month ago for slow transit constipation. This is a chronic condition. She was seen at [facility] for a fecal impaction a few months ago. She was also found to be anemic and required a blood transfusion. She also has moderate dementia. She does live in an assisted living facility and is here with her care worker who notes that the patient is still having difficulty moving her bowels, which is not daily. Her last bowel movement was yesterday, in which she passed a large formed stool. She does not report blood in her stool.  Her care worker today tells me she does not eat very well and she certainly does not junk enough fluids. She does report to me that the patient does take her stool softeners twice a day. I did note that it was recommended that if she was not having 1-2 soft bowel movements a day that the patient would likely need to start taking polyethylene glycol, I think we are at the point now. I told the care worker that the patient will need to definitely drink plenty of fluids if we are going to have her start this. She was given a prescription for MiraLAX, she is to take this at least twice a day until she starts having a soft bowel movement daily. She is to follow up with a new provider that replaces her PCP in one month. She was given strict ER precautions for severe abdominal pain.

## 2017-03-17 NOTE — PATIENT INSTRUCTIONS
Follow up in 1 month with provider that takes Dr. Granado or even me if we do not have someone in place    Really encourage the fluids    Continue with the twice daily stool softeners as already ordered, will add Miralax take twice a day until having daily soft BM then call for further instruction or can discuss at follow up appt.

## 2017-03-17 NOTE — PROGRESS NOTES
Chief Complaint   Patient presents with   • GI Problem     not eating / bm last 3.16.17 / losing weight / stomache hurts         This is a 81 y.o.female patient that presents today with the following: Follow-up visit, constipation    Slow transit constipation  81-year-old female patient seen about a month ago for slow transit constipation. This is a chronic condition. She was seen at [facility] for a fecal impaction a few months ago. She was also found to be anemic and required a blood transfusion. She also has moderate dementia. She does live in an assisted living facility and is here with her care worker who notes that the patient is still having difficulty moving her bowels, which is not daily. Her last bowel movement was yesterday, in which she passed a large formed stool. She does not report blood in her stool.  Her care worker today tells me she does not eat very well and she certainly does not junk enough fluids. She does report to me that the patient does take her stool softeners twice a day. I did note that it was recommended that if she was not having 1-2 soft bowel movements a day that the patient would likely need to start taking polyethylene glycol, I think we are at the point now. I told the care worker that the patient will need to definitely drink plenty of fluids if we are going to have her start this. She was given a prescription for MiraLAX, she is to take this at least twice a day until she starts having a soft bowel movement daily. She is to follow up with a new provider that replaces her PCP in one month. She was given strict ER precautions for severe abdominal pain.      Hospital Outpatient Visit on 02/21/2017   Component Date Value   • WBC 02/21/2017 6.9    • RBC 02/21/2017 4.01*   • Hemoglobin 02/21/2017 11.0*   • Hematocrit 02/21/2017 36.1*   • MCV 02/21/2017 90.0    • MCH 02/21/2017 27.4    • MCHC 02/21/2017 30.5*   • RDW 02/21/2017 61.6*   • Platelet Count 02/21/2017 392     • MPV 02/21/2017 9.9    • Neutrophils-Polys 02/21/2017 59.80    • Lymphocytes 02/21/2017 28.50    • Monocytes 02/21/2017 6.50    • Eosinophils 02/21/2017 3.90    • Basophils 02/21/2017 1.00    • Immature Granulocytes 02/21/2017 0.30    • Nucleated RBC 02/21/2017 0.00    • Neutrophils (Absolute) 02/21/2017 4.11    • Lymphs (Absolute) 02/21/2017 1.96    • Monos (Absolute) 02/21/2017 0.45    • Eos (Absolute) 02/21/2017 0.27    • Baso (Absolute) 02/21/2017 0.07    • Immature Granulocytes (a* 02/21/2017 0.02    • NRBC (Absolute) 02/21/2017 0.00    • Sodium 02/21/2017 134*   • Potassium 02/21/2017 4.3    • Chloride 02/21/2017 102    • Co2 02/21/2017 23    • Glucose 02/21/2017 95    • Bun 02/21/2017 41*   • Creatinine 02/21/2017 1.20    • Calcium 02/21/2017 9.9    • Anion Gap 02/21/2017 9.0    • GFR If  02/21/2017 52*   • GFR If Non  Ameri* 02/21/2017 43*         clinical course has fluctuated    Past Medical History   Diagnosis Date   • CATARACT    • Hypertension    • OSTEOPOROSIS        Past Surgical History   Procedure Laterality Date   • Eye surgery         Family History   Problem Relation Age of Onset   • Other Mother    • Lung Disease Father    • Lung Disease Brother    • Lung Disease Brother        Other drug    Current Outpatient Prescriptions Ordered in [facility]   Medication Sig Dispense Refill   • polyethylene glycol 3350 (MIRALAX) Powder Take 17 g by mouth every day. 1 Bottle 3   • citalopram (CELEXA) 20 MG Tab Take 1 Tab by mouth every day. TAKE 1 TABLET BY MOUTH DAILY 90 Tab 3   • ferrous sulfate 325 (65 FE) MG tablet Take 325 mg by mouth every day.     • trazodone (DESYREL) 50 MG Tab Take 50 mg by mouth every evening.     • ibuprofen (MOTRIN) 200 MG Tab Take 200 mg by mouth every 6 hours as needed.     • docusate sodium (COLACE) 100 MG Cap Take 1 Cap by mouth 2 times a day. 60 Cap 3   • donepezil (ARICEPT) 10 MG tablet TAKE 1 TABLET BY MOUTH DAILY 90 Tab 3   •  "lisinopril-hydrochlorothiazide (PRINZIDE, ZESTORETIC) 20-12.5 MG per tablet TAKE 1 TABLET BY MOUTH DAILY 90 Tab 3   • lovastatin (MEVACOR) 40 MG tablet TAKE 1 TABLET BY MOUTH NIGHTLY AT BEDTIME 90 Tab 3   • mirtazapine (REMERON) 15 MG Tab Take 1 Tab by mouth every bedtime. 30 Tab 11   • aspirin (ASA) 325 MG TABS Take 1 Tab by mouth every day. 90 Tab 3   • Multiple Vitamins-Minerals (OCUVITE) TABS Take 1 Tab by mouth every day. 90 Tab 3   • Cholecalciferol (VITAMIN D) 2000 UNITS CAPS Take 1 Cap by mouth every day. 90 Cap 3   • Calcium Carbonate-Vitamin D (CALCIUM-VITAMIN D) 600-200 MG-UNIT CAPS Take 1 Cap by mouth every day. 90 Cap 3   • potassium chloride SA (K-DUR) 20 MEQ Tab CR Take 1 Tab by mouth every day. 30 Tab 11     No current [facility]-ordered facility-administered medications on file.       Constitutional ROS: No unexpected change in weight, No weakness, No unexplained fevers, sweats, or chills  Pulmonary ROS: No chronic cough, sputum, or hemoptysis, No shortness of breath, No recent change in breathing  Cardiovascular ROS: No chest pain, No edema, No palpitations  Gastrointestinal ROS: Positive per history of present illness  Musculoskeletal/Extremities ROS: No clubbing, No cyanosis, No pain, redness or swelling on the joints  Neurologic ROS: Normal development, No seizures, No weakness. Positive for Parkinson's  Psychiatric ROS: Positive for moderate dementia    Physical exam:  Pulse 100  Temp(Src) 36.6 °C (97.9 °F)  Resp 16  Ht 1.499 m (4' 11\")  Wt 43.999 kg (97 lb)  BMI 19.58 kg/m2  SpO2 95%  General Appearance: alert but significantly demented elderly female, well nourished, well groomed  Skin: Skin color, texture, turgor normal. No rashes or lesions.  Lungs: negative findings: normal respiratory rate and rhythm, normal effort  Heart: negative. RRR without murmur, gallop, or rubs.  No ectopy.  Abdomen: Abdomen soft, mild tenderness with palpation throughout the abdomen. BS normal. No masses,  No " organomegaly  Musculoskeletal: negative findings: no erythema, induration, or nodules, no evidence of joint effusion, no deformities present  Neurologic: intact, oriented to self only. Cranial nerves II-12 grossly intact    Medical decision making/discussion: Patient here for one-month follow-up for slow transit constipation, not improved on twice daily stool softeners. I recommended he start MiraLAX, twice a day until having 1-2 soft bowel movements daily. She is to drink adequate amounts of fluids daily. Care worker who accompanies her today was given strict precautions to Take patient to the emergency room if patient complains of severe abdominal pain and they are to follow up with new provider in one month.    [place] was seen today for gi problem.    Diagnoses and all orders for this visit:    Slow transit constipation  -     polyethylene glycol 3350 (MIRALAX) Powder; Take 17 g by mouth every day.          Please note that this dictation was created using voice recognition software. I have made every reasonable attempt to correct obvious errors, but I expect that there are errors of grammar and possibly content that I did not discover before finalizing the note.

## 2017-03-17 NOTE — MR AVS SNAPSHOT
"Alison Atkinson   3/17/2017 3:40 PM   Office Visit   MRN: [id number]    Department:  [facility]   Dept Phone:  [phone]    Description:  Female : 1935   Provider:  MAYTE Trevino           Reason for Visit     GI Problem not eating / bm last 3..17 / losing weight / stomache hurts      Allergies as of 3/17/2017     Allergen Noted Reactions    Other Drug 2013       ALL NARCOTICS       You were diagnosed with     Slow transit constipation   [564.01.ICD-9-CM]         Vital Signs     Pulse Temperature Respirations Height Weight Body Mass Index    100 36.6 °C (97.9 °F) 16 1.499 m (4' 11\") 43.999 kg (97 lb) 19.58 kg/m2    Oxygen Saturation Smoking Status                95% Former Smoker          Basic Information     Date Of Birth Sex Race Ethnicity Preferred Language    1935 Female Unknown Unknown English      Your appointments     2017 10:40 AM   Established Patient with Beba Granado M.D.   [facility])    [address] [place] [address] [phone]           You will be receiving a confirmation call a few days before your appointment from our automated call confirmation system.              Problem List              ICD-10-CM Priority Class Noted - Resolved    Hypertension I10   2013 - Present    OSTEOPOROSIS    2013 - Present    Dyslipidemia E78.5   2013 - Present    Vitamin D deficiency disease E55.9   2013 - Present    Dementia F03.90   10/24/2013 - Present    DNR (do not resuscitate) Z66   4/3/2014 - Present    Anxiety F41.9   2015 - Present    Agitation R45.1   2016 - Present    Parkinson disease (CMS-HCC) G20   2016 - Present    Parkinsonian tremor (CMS-HCC) G20   2016 - Present    Diarrhea R19.7   2017 - Present    Weight loss R63.4   2017 - Present    Mild pulmonary hypertension (CMS-[facility]) I27.2   2/10/2017 - Present    Anemia due to chronic blood loss D50.0   " 2/10/2017 - Present    Chronic respiratory failure with hypoxia (CMS-HCC) J96.11   2/21/2017 - Present    Slow transit constipation K59.01   2/21/2017 - Present      Health Maintenance        Date Due Completion Dates    IMM DTaP/Tdap/Td Vaccine (1 - Tdap) 12/20/1956 ---    IMM ZOSTER VACCINE 12/20/1997 ---    IMM INFLUENZA (1) 9/1/2016 11/25/2015, 11/26/2014    IMM PNEUMOCOCCAL 65+ (ADULT) LOW/MEDIUM RISK SERIES (2 of 2 - PPSV23) 1/25/2017 1/25/2016    BONE DENSITY 6/26/2018 6/26/2013            Current Immunizations     13-VALENT PCV PREVNAR 1/25/2016  2:45 PM    Influenza TIV (IM) 11/25/2015, 11/26/2014    Tuberculin Skin Test 12/6/2016, 12/22/2015, 12/15/2015, 1/22/2015, 10/14/2014, 2/27/2014 10:45 AM, 2/20/2014  1:30 PM      Below and/or attached are the medications your provider expects you to take. Review all of your home medications and newly ordered medications with your provider and/or pharmacist. Follow medication instructions as directed by your provider and/or pharmacist. Please keep your medication list with you and share with your provider. Update the information when medications are discontinued, doses are changed, or new medications (including over-the-counter products) are added; and carry medication information at all times in the event of emergency situations     Allergies:  OTHER DRUG - (reactions not documented)               Medications  Valid as of: March 17, 2017 -  4:08 PM    Generic Name Brand Name Tablet Size Instructions for use    Aspirin (Tab)  MG Take 1 Tab by mouth every day.        Calcium Carbonate-Vitamin D (Cap) Calcium-Vitamin D 600-200 MG-UNIT Take 1 Cap by mouth every day.        Cholecalciferol (Cap) Vitamin D 2000 UNITS Take 1 Cap by mouth every day.        Citalopram Hydrobromide (Tab) CELEXA 20 MG Take 1 Tab by mouth every day. TAKE 1 TABLET BY MOUTH DAILY        Docusate Sodium (Cap) COLACE 100 MG Take 1 Cap by mouth 2 times a day.        Donepezil HCl (Tab)  ARICEPT 10 MG TAKE 1 TABLET BY MOUTH DAILY        Ferrous Sulfate (Tab) ferrous sulfate 325 (65 FE) MG Take 325 mg by mouth every day.        Ibuprofen (Tab) MOTRIN 200 MG Take 200 mg by mouth every 6 hours as needed.        Lisinopril-Hydrochlorothiazide (Tab) PRINZIDE, ZESTORETIC 20-12.5 MG TAKE 1 TABLET BY MOUTH DAILY        Lovastatin (Tab) MEVACOR 40 MG TAKE 1 TABLET BY MOUTH NIGHTLY AT BEDTIME        Mirtazapine (Tab) REMERON 15 MG Take 1 Tab by mouth every bedtime.        Multiple Vitamins-Minerals (Tab) OCUVITE  Take 1 Tab by mouth every day.        Polyethylene Glycol 3350 (Powder) MIRALAX  Take 17 g by mouth every day.        Potassium Chloride Crystal CR (Tab CR) Kdur 20 MEQ Take 1 Tab by mouth every day.        TraZODone HCl (Tab) DESYREL 50 MG Take 50 mg by mouth every evening.        .                 Medicines prescribed today were sent to:     [facility] [address] [address]    Phone: [phone] Fax: [phone]    Open 24 Hours?: No      Medication refill instructions:       If your prescription bottle indicates you have medication refills left, it is not necessary to call your provider’s office. Please contact your pharmacy and they will refill your medication.    If your prescription bottle indicates you do not have any refills left, you may request refills at any time through one of the following ways: The online [employer] system (except Urgent Care), by calling your provider’s office, or by asking your pharmacy to contact your provider’s office with a refill request. Medication refills are processed only during regular business hours and may not be available until the next business day. Your provider may request additional information or to have a follow-up visit with you prior to refilling your medication.   *Please Note: Medication refills are assigned a new Rx number when refilled electronically. Your pharmacy may indicate that no refills were  authorized even though a new prescription for the same medication is available at the pharmacy. Please request the medicine by name with the pharmacy before contacting your provider for a refill.        Instructions    Follow up in 1 month with provider that takes Dr. Granado or even me if we do not have someone in place    Really encourage the fluids    Continue with the twice daily stool softeners as already ordered, will add Miralax take twice a day until having daily soft BM then call for further instruction or can discuss at follow up appt.          MyChart Status: Patient Declined

## 2017-03-24 NOTE — TELEPHONE ENCOUNTER
Yes, I will order low dose lorazepam to be given every 8 hours as needed. Please fax to her pharmacy

## 2017-03-24 NOTE — TELEPHONE ENCOUNTER
Mansi from [facility] called stating that pt is being disruptive to the other residents.  Loud, high anxiety, getting louder.  They are wanting to know if they can have something to calm her down especially in the afternoon.

## 2017-04-17 NOTE — TELEPHONE ENCOUNTER
To evaluate if it is appropriate to continue ativan, she should make an appointment with me for evaluation. This is because this is a controlled medication and has potential for harm or abuse.   Berry Nelson M.D.

## 2017-04-17 NOTE — TELEPHONE ENCOUNTER
Was the patient seen in the last year in this department? Yes     Does patient have an active prescription for medications requested? No  Dr Nelson seeing pt today at 10:40    Received Request Via: Pharmacy

## 2017-04-20 NOTE — ASSESSMENT & PLAN NOTE
Patient lives in the assisted living [facility]. She has been having more agitation, with yelling and running around and being aggressive toward other inmates. This is not a new behavior and ongoing for years due to dementia. She has no fevers, chills, pain or dysuria. She is on lorazepam 0.5 mg before every meal as needed and since it is now being needed everyday at this frequency, the request is to have it ordered as scheduled and not as a prn medication. I reviewed risks of sedation, tolerance, falls and since she has been stable on the current dose and frequency with no adverse reactions, I will rx it on a schedule three times a day before meals but to carefully monitor for sedation or falls.

## 2017-04-20 NOTE — MR AVS SNAPSHOT
"Alison Atkinson   2017 4:20 PM   Office Visit   MRN: [id number]    Department:  [facility]   Dept Phone:  [phone]    Description:  Female : 1935   Provider:  Berry Nelson M.D.           Reason for Visit     Medication Refill ativan / lorazepam schedule      Allergies as of 2017     Allergen Noted Reactions    Other Drug 2013       ALL NARCOTICS       You were diagnosed with     Weight loss   [293640]       Agitation   [396303]         Vital Signs     Blood Pressure Pulse Temperature Respirations Height Weight    110/78 mmHg 101 37.3 °C (99.1 °F) 114 1.499 m (4' 11\") 43.999 kg (97 lb)    Body Mass Index Oxygen Saturation Smoking Status             19.58 kg/m2 91% Former Smoker         Basic Information     Date Of Birth Sex Race Ethnicity Preferred Language    1935 Female Unknown Unknown English      Problem List              ICD-10-CM Priority Class Noted - Resolved    Hypertension I10   2013 - Present    OSTEOPOROSIS    2013 - Present    Dyslipidemia E78.5   2013 - Present    Vitamin D deficiency disease E55.9   2013 - Present    Dementia F03.90   10/24/2013 - Present    DNR (do not resuscitate) Z66   4/3/2014 - Present    Anxiety F41.9   2015 - Present    Agitation R45.1   2016 - Present    Parkinson disease (CMS-HCC) G20   2016 - Present    Parkinsonian tremor (CMS-HCC) G20   2016 - Present    Diarrhea R19.7   2017 - Present    Weight loss R63.4   2017 - Present    Mild pulmonary hypertension (CMS-HCC) I27.2   2/10/2017 - Present    Anemia due to chronic blood loss D50.0   2/10/2017 - Present    Chronic respiratory failure with hypoxia (CMS-HCC) J96.11   2017 - Present    Slow transit constipation K59.01   2017 - Present      Health Maintenance        Date Due Completion Dates    IMM DTaP/Tdap/Td Vaccine (1 - Tdap) 1956 ---    IMM ZOSTER VACCINE 1997 ---    IMM PNEUMOCOCCAL 65+ (ADULT) " LOW/MEDIUM RISK SERIES (2 of 2 - PPSV23) 1/25/2017 1/25/2016    BONE DENSITY 6/26/2018 6/26/2013            Current Immunizations     13-VALENT PCV PREVNAR 1/25/2016  2:45 PM    Influenza TIV (IM) 11/25/2015, 11/26/2014    Tuberculin Skin Test 12/6/2016, 12/22/2015, 12/15/2015, 1/22/2015, 10/14/2014, 2/27/2014 10:45 AM, 2/20/2014  1:30 PM      Below and/or attached are the medications your provider expects you to take. Review all of your home medications and newly ordered medications with your provider and/or pharmacist. Follow medication instructions as directed by your provider and/or pharmacist. Please keep your medication list with you and share with your provider. Update the information when medications are discontinued, doses are changed, or new medications (including over-the-counter products) are added; and carry medication information at all times in the event of emergency situations     Allergies:  OTHER DRUG - (reactions not documented)               Medications  Valid as of: April 20, 2017 -  4:58 PM    Generic Name Brand Name Tablet Size Instructions for use    Aspirin (Tab)  MG Take 1 Tab by mouth every day.        Calcium Carbonate-Vitamin D (Cap) Calcium-Vitamin D 600-200 MG-UNIT Take 1 Cap by mouth every day.        Cholecalciferol (Cap) Vitamin D 2000 UNITS Take 1 Cap by mouth every day.        Citalopram Hydrobromide (Tab) CELEXA 20 MG Take 1 Tab by mouth every day. TAKE 1 TABLET BY MOUTH DAILY        Docusate Sodium (Cap) COLACE 100 MG Take 1 Cap by mouth 2 times a day.        Donepezil HCl (Tab) ARICEPT 10 MG TAKE 1 TABLET BY MOUTH DAILY        Ferrous Sulfate (Tab) ferrous sulfate 325 (65 FE) MG Take 325 mg by mouth every day.        Ibuprofen (Tab) MOTRIN 200 MG Take 200 mg by mouth every 6 hours as needed.        Lisinopril-Hydrochlorothiazide (Tab) PRINZIDE, ZESTORETIC 20-12.5 MG TAKE 1 TABLET BY MOUTH DAILY        LORazepam (Tab) ATIVAN 0.5 MG Take 1 Tab by mouth 3 times a day for 30  days.        Lovastatin (Tab) MEVACOR 40 MG TAKE 1 TABLET BY MOUTH NIGHTLY AT BEDTIME        Mirtazapine (Tab) REMERON 15 MG Take 1 Tab by mouth every bedtime.        Multiple Vitamins-Minerals (Tab) OCUVITE  Take 1 Tab by mouth every day.        Nutritional Supplements (Liquid) NUTRITIONAL SUPPLEMENT PLUS  Take 1 Can by mouth 3 times a day, with meals.        Polyethylene Glycol 3350 (Powder) MIRALAX  Take 17 g by mouth every day.        Potassium Chloride Crystal CR (Tab CR) Kdur 20 MEQ Take 1 Tab by mouth every day.        TraZODone HCl (Tab) DESYREL 50 MG Take 50 mg by mouth every evening.        .                 Medicines prescribed today were sent to:     [facility] [address] [facility]    [address] [place] [address]    Phone: [phone] Fax: [phone]    Open 24 Hours?: No      Medication refill instructions:       If your prescription bottle indicates you have medication refills left, it is not necessary to call your provider’s office. Please contact your pharmacy and they will refill your medication.    If your prescription bottle indicates you do not have any refills left, you may request refills at any time through one of the following ways: The online [employer] system (except Urgent Care), by calling your provider’s office, or by asking your pharmacy to contact your provider’s office with a refill request. Medication refills are processed only during regular business hours and may not be available until the next business day. Your provider may request additional information or to have a follow-up visit with you prior to refilling your medication.   *Please Note: Medication refills are assigned a new Rx number when refilled electronically. Your pharmacy may indicate that no refills were authorized even though a new prescription for the same medication is available at the pharmacy. Please request the medicine by name with the pharmacy before contacting your provider for a refill.                MyChart Status: Patient Declined

## 2017-04-21 NOTE — ASSESSMENT & PLAN NOTE
Stable on liquid nutritional supplement three times a day with meals.  rx sent to the pharmacy for a month supply with 11 refills.

## 2017-04-21 NOTE — PROGRESS NOTES
Subjective:   Alison Atkinson is a 81 y.o. female here today for agitation, weight loss and  Hypertension which are chronic issues and stable.     Agitation  Patient lives in the assisted living [facility]. She has been having more agitation, with yelling and running around and being aggressive toward other inmates. This is not a new behavior and ongoing for years due to dementia. She has no fevers, chills, pain or dysuria. She is on lorazepam 0.5 mg before every meal as needed and since it is now being needed everyday at this frequency, the request is to have it ordered as scheduled and not as a prn medication. I reviewed risks of sedation, tolerance, falls and since she has been stable on the current dose and frequency with no adverse reactions, I will rx it on a schedule three times a day before meals but to carefully monitor for sedation or falls.     Weight loss  Stable on liquid nutritional supplement three times a day with meals.  rx sent to the pharmacy for a month supply with 11 refills.    Hypertension  Controlled on current medication with no chest pain or swelling of legs         Current medicines (including changes today)  Current Outpatient Prescriptions   Medication Sig Dispense Refill   • Nutritional Supplements (NUTRITIONAL SUPPLEMENT PLUS) Liquid Take 1 Can by mouth 3 times a day, with meals. 90 Can 11   • lorazepam (ATIVAN) 0.5 MG Tab Take 1 Tab by mouth 3 times a day for 30 days. 90 Tab 2   • polyethylene glycol 3350 (MIRALAX) Powder Take 17 g by mouth every day. 1 Bottle 3   • ferrous sulfate 325 (65 FE) MG tablet Take 325 mg by mouth every day.     • trazodone (DESYREL) 50 MG Tab Take 50 mg by mouth every evening.     • ibuprofen (MOTRIN) 200 MG Tab Take 200 mg by mouth every 6 hours as needed.     • donepezil (ARICEPT) 10 MG tablet TAKE 1 TABLET BY MOUTH DAILY 90 Tab 3   • lisinopril-hydrochlorothiazide (PRINZIDE, ZESTORETIC) 20-12.5 MG per tablet TAKE 1 TABLET BY MOUTH DAILY 90 Tab 3   •  "lovastatin (MEVACOR) 40 MG tablet TAKE 1 TABLET BY MOUTH NIGHTLY AT BEDTIME 90 Tab 3   • mirtazapine (REMERON) 15 MG Tab Take 1 Tab by mouth every bedtime. 30 Tab 11   • aspirin (ASA) 325 MG TABS Take 1 Tab by mouth every day. 90 Tab 3   • Multiple Vitamins-Minerals (OCUVITE) TABS Take 1 Tab by mouth every day. 90 Tab 3   • Cholecalciferol (VITAMIN D) 2000 UNITS CAPS Take 1 Cap by mouth every day. 90 Cap 3   • Calcium Carbonate-Vitamin D (CALCIUM-VITAMIN D) 600-200 MG-UNIT CAPS Take 1 Cap by mouth every day. 90 Cap 3   • citalopram (CELEXA) 20 MG Tab Take 1 Tab by mouth every day. TAKE 1 TABLET BY MOUTH DAILY 90 Tab 3   • docusate sodium (COLACE) 100 MG Cap Take 1 Cap by mouth 2 times a day. 60 Cap 3   • potassium chloride SA (K-DUR) 20 MEQ Tab CR Take 1 Tab by mouth every day. 30 Tab 11     No current facility-administered medications for this visit.     She  has a past medical history of CATARACT; Hypertension; and OSTEOPOROSIS.    ROS   No chest pain, no shortness of breath, no abdominal pain       Objective:     Blood pressure 110/78, pulse 101, temperature 37.3 °C (99.1 °F), resp. rate 14, height 1.499 m (4' 11\"), weight 43.999 kg (97 lb), SpO2 91 %. Body mass index is 19.58 kg/(m^2).   Physical Exam:  Constitutional: Alert, no distress.  Skin: Warm, dry, good turgor, no rashes in visible areas.  Eye: Equal, round and reactive, conjunctiva clear, lids normal.  ENMT: Lips without lesions, good dentition, oropharynx clear.  Neck: Trachea midline, no masses, no thyromegaly. No cervical or supraclavicular lymphadenopathy  Respiratory: Unlabored respiratory effort, lungs clear to auscultation, no wheezes, no ronchi.  Cardiovascular: Normal S1, S2, no murmur, no edema.  Abdomen: Soft, non-tender, no masses, no hepatosplenomegaly.  Psych: Alert and oriented x3, normal affect and mood.        Assessment and Plan:   The following treatment plan was discussed    1. Weight loss  Stable  Refills on nutritional supplement. "   - Nutritional Supplements (NUTRITIONAL SUPPLEMENT PLUS) Liquid; Take 1 Can by mouth 3 times a day, with meals.  Dispense: 90 Can; Refill: 11    2. Agitation  Needs ativan on a schedule rather than prn. Advised to monitor closely for sedation and falls. Patient does not show on the [facility] site, confirmed with [facility]'s pharmacy that they are dispensing the medication to her.   - lorazepam (ATIVAN) 0.5 MG Tab; Take 1 Tab by mouth 3 times a day for 30 days.  Dispense: 90 Tab; Refill: 2    3. Essential hypertension  Controlled continue current medication.      Followup: Return in about 3 months (around 7/20/2017).

## 2017-05-09 PROBLEM — J18.9 PNEUMONIA: Status: ACTIVE | Noted: 2017-01-01

## 2017-05-09 NOTE — PROGRESS NOTES
Pt. Arrived via gurney thru hospital transport. Assumed care. Pt. Is awake, on bed. Initial vital signs taken and documented. Admit profile, med rec and assessment completed with TARAN Farnsworth. A&Ox1, responds to name, baseline dementia per Get. x1-2 assist with turning and repositioning. SLP at bedside. Aspi prec in place. HOB elevated at 30 degree or more at all times, suction at bedside. Safety precautions in place. Bed alarm ON, bed kept low and locked, call light within reach.

## 2017-05-09 NOTE — IP AVS SNAPSHOT
" <p align=\"LEFT\"><IMG SRC=\"//[email or web address]$/[email or web address]\" alt=\"Image\" WIDTH=\"50%\" HEIGHT=\"200\" BORDER=\"\"></p>                   Name:Alison Atkinson  Medical Record Number:[id number]  CSN: [id number]    YOB: 1935   Age: 81 y.o.  Sex: female  HT:1.499 m (4' 11.02\") WT: 36.288 kg (80 lb)          Admit Date: 5/9/2017     Discharge Date:   Today's Date: 5/13/2017  Attending Doctor:  Naila Aguilar D.O.                  Allergies:  Other drug          Your appointments     Jul 20, 2017 10:20 AM   Established Patient with Berry Nelson M.D.   [facility]    [address] [place] [address]  [place] [address] [phone]           You will be receiving a confirmation call a few days before your appointment from our automated call confirmation system.              Follow-up Information     1. Follow up with MAYTE Trevino. Schedule an appointment as soon as possible for a visit in 2 weeks.    Specialty:  Family Medicine    Why:  Follow-up appointment    Contact information    [address] [place] [address] [address] [place] [address] [phone]          2. Follow up with [facility] ([facility]) .    Specialty:  Hospice    Contact information    [address] [place] [address] [phone]         Medication List      Take these Medications        Instructions    amoxicillin-clavulanate 875-125 MG Tabs   Start taking on:  5/14/2017   Commonly known as:  AUGMENTIN    Take 1 Tab by mouth 2 times a day for 4 days.   Dose:  1 Tab       ATIVAN 0.5 MG Tabs   Generic drug:  lorazepam    Take 0.5 mg by mouth 3 times a day as needed for Anxiety.   Dose:  0.5 mg       azithromycin 250 MG Tabs   Start taking on:  5/14/2017   Commonly known as:  ZITHROMAX    Take 1 Tab by mouth every day for 3 days.   Dose:  250 mg       docusate sodium 100 MG Caps   Commonly known as:  COLACE    Take 1 Cap by mouth 2 times a day.   Dose:  100 mg       mirtazapine 15 MG Tabs   "   Commonly known as:  REMERON    Take 1 Tab by mouth every bedtime.   Dose:  15 mg

## 2017-05-09 NOTE — ED PROVIDER NOTES
ED Provider Note    CHIEF COMPLAINT  Chief Complaint   Patient presents with   • Failure to Thrive       HPI  Alison Atkinson is a 81 y.o. female who presents for evaluation of weakness and failure to thrive.  The patient has multiple medical problems including dementia.  I did review the transfer information and the patient is a do not resuscitate status but wants comfort care measures only.  The patient was sent in because of not eating or drinking for the last 2-3 days.  There is been no history of: Fever, cough, sputum, chest pain, vomiting, hematemesis, melena, hematochezia.  The patient is demented and is unable to provide any beneficial history.  Information was obtained from the transferring facility along with EMS.    REVIEW OF SYSTEMS  See HPI for further details.  Unobtainable due to her condition;    PAST MEDICAL HISTORY  Past Medical History   Diagnosis Date   • CATARACT    • Hypertension    • OSTEOPOROSIS         Recent problem list identified by primary care:    Patient Active Problem List      Diagnosis  Date Noted    •  Chronic respiratory failure with hypoxia (CMS-HCC)  02/21/2017    •  Slow transit constipation  02/21/2017    •  Mild pulmonary hypertension (CMS-HCC)  02/10/2017    •  Anemia due to chronic blood loss  02/10/2017    •  Diarrhea  01/17/2017    •  Weight loss  01/17/2017    •  Agitation  07/07/2016    •  Parkinson disease (CMS-HCC)  07/07/2016    •  Parkinsonian tremor (CMS-HCC)  07/07/2016    •  Anxiety  01/22/2015    •  DNR (do not resuscitate)  04/03/2014    •  Dementia  10/24/2013    •  Dyslipidemia  06/18/2013    •  Vitamin D deficiency disease  06/18/2013    •  Hypertension  06/11/2013    •  OSTEOPOROSIS  06/11/2013                FAMILY HISTORY  Family History   Problem Relation Age of Onset   • Other Mother    • Lung Disease Father    • Lung Disease Brother    • Lung Disease Brother        SOCIAL HISTORY  Past history of tobacco use; patient is Comfort Care measures only; DNR  "status    SURGICAL HISTORY  Past Surgical History   Procedure Laterality Date   • Eye surgery         CURRENT MEDICATIONS  Home Medications     Reviewed by Rj Santacruz R.N. (Registered Nurse) on 05/09/17 at 0718  Med List Status: Partial    Medication Last Dose Status    aspirin (ASA) 325 MG TABS  Active    Calcium Carbonate-Vitamin D (CALCIUM-VITAMIN D) 600-200 MG-UNIT CAPS  Active    Cholecalciferol (VITAMIN D) 2000 UNITS CAPS  Active    citalopram (CELEXA) 20 MG Tab  Active    docusate sodium (COLACE) 100 MG Cap  Active    donepezil (ARICEPT) 10 MG tablet  Active    ferrous sulfate 325 (65 FE) MG tablet  Active    ibuprofen (MOTRIN) 200 MG Tab  Active    lisinopril-hydrochlorothiazide (PRINZIDE, ZESTORETIC) 20-12.5 MG per tablet  Active    lorazepam (ATIVAN) 0.5 MG Tab  Active    lovastatin (MEVACOR) 40 MG tablet  Active    mirtazapine (REMERON) 15 MG Tab  Active    Multiple Vitamins-Minerals (OCUVITE) TABS  Active    Nutritional Supplements (NUTRITIONAL SUPPLEMENT PLUS) Liquid  Active    polyethylene glycol 3350 (MIRALAX) Powder  Active    potassium chloride SA (K-DUR) 20 MEQ Tab CR  Active    trazodone (DESYREL) 50 MG Tab  Active                ALLERGIES  Allergies   Allergen Reactions   • Other Drug      ALL NARCOTICS        PHYSICAL EXAM  VITAL SIGNS: /43 mmHg  Pulse 78  Temp(Src) 36.4 °C (97.5 °F)  Resp 16  Ht 1.499 m (4' 11.02\")  Wt 36.288 kg (80 lb)  BMI 16.15 kg/m2  SpO2 95%   Constitutional: 11-year-old female, thin, cachectic, oriented ×1 only  HENT: ,Atraumatic, Bilateral external ears normal, tympanic membranes clear, Oropharynx is moderately dry No oral exudates, Nose normal.   Eyes: PERRL, EOMI, Conjunctiva normal, No discharge.   Neck: Normal range of motion, No tenderness, Supple, No stridor.   Lymphatic: No lymphadenopathy noted.   Cardiovascular: Normal heart rate, Normal rhythm, No murmurs, No rubs, No gallops.   Thorax & Lungs: Bilateral rhonchi, No respiratory distress, No " wheezing, no stridor, no rales. No chest tenderness.   Abdomen: Soft, nontender, nondistended, no organomegaly, positive bowel sounds normal in quality. No guarding or rebound.  Skin: Decreased skin turgor, pale, warm, dry. No rashes, petechiae, purpura. Normal capillary refill.   Back: No tenderness, No CVA tenderness.   Extremities: Intact distal pulses, No edema, No tenderness, No cyanosis, No clubbing. Vascular: Pulses are 2+, symmetric in the upper and lower extremities.  Musculoskeletal: He is arthritic changes. No tenderness to palpation or major deformities noted.   Neurologic: Weak appearing, oriented ×1, Normal motor function, Normal sensory function, No gross focal deficits noted.     EKG  I have interpreted: Rate 70, rhythm sinus, left axis deviation, nonspecific ST-T wave changes, 12-lead EKG, no old tracing for comparison;    RADIOLOGY/PROCEDURES  DX-CHEST-PORTABLE (1 VIEW)   Final Result      Right upper lobe opacity likely represents pneumonia.      Patchy bibasilar opacities may represent atelectasis or pneumonitis.      Prominent atherosclerotic plaque.      Stable blunting of the left costophrenic angle may be related to pleural thickening.            COURSE & MEDICAL DECISION MAKING  Pertinent Labs & Imaging studies reviewed. (See chart for details)  1.  Monitor  2.  IV normal saline: Sepsis protocol  3.  Azithromycin  4.  Unasyn    Laboratory studies: CBC shows white count 9.0, 84% neutrophils, 8% monocytes, 6% monocytes, hemoglobin 9.2, hematocrit 29.6; CMP shows a random glucose 162, BUN 74, creatinine 2.72, alk phosphatase 116; troponin less than 0.01; coags within normal limits; lactic acid 1.3; urinalysis was a cath specimen showed small leukocyte esterase, bacteria many;    Discussion/consultation: At this time, the patient presents for evaluation of failure to thrive along with hypoxia and decreased oral intake.  The patient appears very dehydrated and has laboratory studies consistent  with this as well as acute renal insufficiency.  She appears to be developing pneumonia and was started on antibiotics.  I did speak with her nephew who has durable power of .  The patient is a DNR status and comfort care.  I spent 35 minutes of bedside attendance evaluating the patient, reassessing the patient, reviewing laboratory and imaging studies, speak with family members, speaking with consultants, implementing treatment and reviewing response to treatment.  The patient will be admitted for further monitoring, treatment, and care.    FINAL IMPRESSION  1. Dehydration    2. Renal insufficiency    3. Pneumonia of right upper lobe due to infectious organism    4. Anemia due to chronic blood loss    5. Dementia without behavioral disturbance, unspecified dementia type    6.      Critical care time, 35 minutes      PLAN  1.  The patient will be admitted for further monitoring, treatment, and care.    Electronically signed by: Guy G Gansert, 5/9/2017 7:22 AM

## 2017-05-09 NOTE — IP AVS SNAPSHOT
" Home Care Instructions                                                                                                                  Name:Alison Atkinson  Medical Record Number:[id number]  CSN: [id number]    YOB: 1935   Age: 81 y.o.  Sex: female  HT:1.499 m (4' 11.02\") WT: 36.288 kg (80 lb)          Admit Date: 5/9/2017     Discharge Date:   Today's Date: 5/13/2017  Attending Doctor:  Naila Aguilar D.O.                  Allergies:  Other drug            Discharge Instructions       Discharge Instructions    Discharged to group home by ambulance with escort. Discharged via ambulance, hospital escort: Refused.  Special equipment needed: Not Applicable    Be sure to schedule a follow-up appointment with your primary care doctor or any specialists as instructed.     Discharge Plan:   Influenza Vaccine Indication: Indicated: Not available from distributor/    I understand that a diet low in cholesterol, fat, and sodium is recommended for good health. Unless I have been given specific instructions below for another diet, I accept this instruction as my diet prescription.   Other diet: Nectar thick liquids    Special Instructions: None    · Is patient discharged on Warfarin / Coumadin?   No     · Is patient Post Blood Transfusion?  No    Pneumonia, Adult  Pneumonia is an infection of the lungs.   CAUSES  Pneumonia may be caused by bacteria or a virus. Usually, the infection is caused by breathing in droplets from an infected person's cough or sneeze.   SYMPTOMS   Symptoms of pneumonia include:  · Cough.  · Fever.  · Chest pain.  · Rapid breathing.  · Shortness of breath.  · Shaking chills.  · Mucus production.  DIAGNOSIS   If you have the common symptoms of pneumonia, often your health care provider will confirm the diagnosis with a chest X-ray. The X-ray will show an abnormality in the lung if you have pneumonia. Other tests may be done on your blood, urine, or mucus (sputum) to find the " specific cause of your pneumonia. A blood gas test or pulse oximetry test may be needed to check how well your lungs are working.  TREATMENT   Your treatment will depend on whether your pneumonia is caused by bacteria or a virus.   · Bacterial pneumonia is treated with antibiotic medicine.  · Pneumonia that is caused by the influenza virus may be treated with an antiviral medicine.  · Pneumonia that is caused by a virus other than influenza will not respond to antibiotic medicine. This type of pneumonia will have to run its course.   HOME CARE INSTRUCTIONS   · Cough suppressants may be used if you are losing too much rest from coughing at night. However, you should try to avoid taking cough suppresants. This is because coughing helps to remove mucus from your lungs.  · Sleep in a semi-upright position at night. Try sleeping in a reclining chair, or place a few pillows under your head.  · Try using a cold steam vaporizer or humidifier in your home or bedroom. This may help loosen your mucus.  · If you were prescribed an antibiotic medicine, finish all of it even if you start to feel better.  · If you were prescribed an expectorant, take it as directed by your health care provider. This medicine loosens the mucus so you can cough it up.  · Take medicines only as directed by your health care provider.  · Do not smoke. If you are a smoker and continue to smoke, your cough may last several weeks after your pneumonia has cleared.  · Get rest when you feel tired, or as needed.  PREVENTION  A pneumococcal shot (vaccine) is available to prevent a common bacterial cause of pneumonia. This is usually suggested for:  · People over 65 years old.  · People on chemotherapy.  · People with chronic lung problems, such as bronchitis or emphysema.  · People with immune system problems.  If you are over 65 years old or have a high risk condition, you may receive the pneumococcal vaccine if you have not received it before. In some  countries, a routine influenza vaccine is also recommended. This vaccine can help prevent some cases of pneumonia. You may be offered the influenza vaccine as part of your care.  If you are a smoker, it is time to quit in order to prevent pneumonia in the future. You may receive instructions on how to stop smoking. Your health care provider can provide medicines and counseling to help you quit.  SEEK MEDICAL CARE IF:  · You have a fever.  · You cannot control your cough with suppressants at night, and you keep losing sleep.  SEEK IMMEDIATE MEDICAL CARE IF:   · You have worsening shortness of breath.  · You have increased chest pain.  · Your sickness becomes worse, especially if you are an older adult or have a weakened immune system.  · You cough up blood.  · You have pain that is getting worse or is not controlled with medicines.  · Your symptoms are getting worse rather than better.     This information is not intended to replace advice given to you by your health care provider. Make sure you discuss any questions you have with your health care provider.     Document Released: 12/18/2006 Document Revised: 01/08/2016 Document Reviewed: 04/13/2016  [employer] Interactive Patient Education ©2016 [employer]      Your appointments     Jul 20, 2017 10:20 AM   Established Patient with Berry Nelson M.D.   [facility]    [address] [place] [id number] [phone]           You will be receiving a confirmation call a few days before your appointment from our automated call confirmation system.              Follow-up Information     1. Follow up with MAYTE Trevino. Schedule an appointment as soon as possible for a visit in 2 weeks.    Specialty:  Family Medicine    Why:  Follow-up appointment    Contact information    [address] [place] [address] [address] [place] [address] [phone]          2. Follow up with [facility] ([facility]) .     Specialty:  Hospice    Contact information    [place] [id number]  [phone]         Discharge Medication Instructions:    Below are the medications your physician expects you to take upon discharge:    Review all your home medications and newly ordered medications with your doctor and/or pharmacist. Follow medication instructions as directed by your doctor and/or pharmacist.    Please keep your medication list with you and share with your physician.               Medication List      START taking these medications        Instructions    Morning Afternoon Evening Bedtime    amoxicillin-clavulanate 875-125 MG Tabs   Start taking on:  5/14/2017   Commonly known as:  AUGMENTIN   Next Dose Due:  Start taking tomorrow morning, 5/14.         Take 1 Tab by mouth 2 times a day for 4 days.   Dose:  1 Tab                        azithromycin 250 MG Tabs   Start taking on:  5/14/2017   Last time this was given:  250 mg on 5/13/2017  7:59 AM   Commonly known as:  ZITHROMAX   Next Dose Due:  Start taking tomorrow morning, 5/14.        Take 1 Tab by mouth every day for 3 days.   Dose:  250 mg                          CONTINUE taking these medications        Instructions    Morning Afternoon Evening Bedtime    ATIVAN 0.5 MG Tabs   Last time this was given:  0.5 mg on 5/12/2017  5:52 AM   Generic drug:  lorazepam        Take 0.5 mg by mouth 3 times a day as needed for Anxiety.   Dose:  0.5 mg                        docusate sodium 100 MG Caps   Commonly known as:  COLACE        Take 1 Cap by mouth 2 times a day.   Dose:  100 mg                        mirtazapine 15 MG Tabs   Last time this was given:  15 mg on 5/12/2017  8:04 PM   Commonly known as:  REMERON        Take 1 Tab by mouth every bedtime.   Dose:  15 mg                          STOP taking these medications     aspirin 325 MG Tabs   Commonly known as:  ASA               Calcium-Vitamin D 600-200 MG-UNIT Caps               citalopram 20 MG Tabs      Commonly known as:  CELEXA               donepezil 10 MG tablet   Commonly known as:  ARICEPT               ferrous sulfate 325 (65 FE) MG tablet               ibuprofen 200 MG Tabs   Commonly known as:  MOTRIN               lisinopril-hydrochlorothiazide 20-12.5 MG per tablet   Commonly known as:  PRINZIDE, ZESTORETIC               lovastatin 40 MG tablet   Commonly known as:  MEVACOR               trazodone 50 MG Tabs   Commonly known as:  DESYREL               Vitamin D 2000 UNITS Caps                    Where to Get Your Medications      Information about where to get these medications is not yet available     ! Ask your nurse or doctor about these medications    - amoxicillin-clavulanate 875-125 MG Tabs  - azithromycin 250 MG Tabs            Orders for after discharge     REFERRAL TO HOSPICE    Complete by:  As directed              Instructions           Diet / Nutrition:    Follow any diet instructions given to you by your doctor or the dietician, including how much salt (sodium) you are allowed each day.    If you are overweight, talk to your doctor about a weight reduction plan.    Activity:    Remain physically active following your doctor's instructions about exercise and activity.    Rest often.     Any time you become even a little tired or short of breath, SIT DOWN and rest.    Worsening Symptoms:    Report any of the following signs and symptoms to the doctor's office immediately:    *Pain of jaw, arm, or neck  *Chest pain not relieved by medication                               *Dizziness or loss of consciousness  *Difficulty breathing even when at rest   *More tired than usual                                       *Bleeding drainage or swelling of surgical site  *Swelling of feet, ankles, legs or stomach                 *Fever (>100ºF)  *Pink or blood tinged sputum  *Weight gain (3lbs/day or 5lbs /week)           *Shock from internal defibrillator (if applicable)  *Palpitations or irregular  heartbeats                *Cool and/or numb extremities    Stroke Awareness    Common Risk Factors for Stroke include:    Age  Atrial Fibrillation  Carotid Artery Stenosis  Diabetes Mellitus  Excessive alcohol consumption  High blood pressure  Overweight   Physical inactivity  Smoking    Warning signs and symptoms of a stroke include:    *Sudden numbness or weakness of the face, arm or leg (especially on one side of the body).  *Sudden confusion, trouble speaking or understanding.  *Sudden trouble seeing in one or both eyes.  *Sudden trouble walking, dizziness, loss of balance or coordination.Sudden severe headache with no known cause.    It is very important to get treatment quickly when a stroke occurs. If you experience any of the above warning signs, call 911 immediately.                   Disclaimer         Quit Smoking / Tobacco Use:    I understand the use of any tobacco products increases my chance of suffering from future heart disease or stroke and could cause other illnesses which may shorten my life. Quitting the use of tobacco products is the single most important thing I can do to improve my health. For further information on smoking / tobacco cessation call a Toll Free Quit Line at 1-160.530.3909 (*[employer] [place]) or 1-711.445.5083 (American Lung Association) or you can access the web based program at www.lungusa.org.    [place] Tobacco Users Help Line:  (355) 349-7890       Toll Free: 1-356.657.6577  Quit Tobacco Program [facility] (371)288-6059    Crisis Hotline:    [place] Crisis Hotline:  2-326-FSAOJIZ or 1-792.195.8576    [place] Crisis Hotline:    1-415.452.6766 or 559-935-4670    Discharge Survey:   Thank you for choosing [facility]. We hope we did everything we could to make your hospital stay a pleasant one. You may be receiving a phone survey and we would appreciate your time and participation in answering the questions. Your input is very valuable to us  in our efforts to improve our service to our patients and their families.        My signature on this form indicates that:    1. I have reviewed and understand the above information.  2. My questions regarding this information have been answered to my satisfaction.  3. I have formulated a plan with my discharge nurse to obtain my prescribed medications for home.                  Disclaimer         __________________________________                     __________       ________                       Patient Signature                                                 Date                    Time

## 2017-05-09 NOTE — THERAPY
"Speech Language Therapy Clinical Swallow Evaluation completed.  Functional Status: Patient NPO pending CSE. Per EMR, patient/nephew (who is POA) may wish for comfort measures, but patient's code status DNR and CSE ordered. RN reported he had PO medications to administer as well. Patient's nephew present at bedside who provided PLOF and PLS information, as patient has dementia. Nephew reported that patient has been on a regular diet at Assisted Living facility, but has had poor PO intake. Nephew denies any s/sx of aspiration or dysphagia. Patient noted to be anxious with strained, hoarse, weak vocal quality and throat clearing prior to any PO trials. Patient unable to follow simple directives for oral motor evaluation, but upon inspection, patient was noted to have dry, foul-smelling oral cavity. Oral care via toothettes and suction were provided by this SLP and patient appeared to be orally aversive at times. Patient was provided with PO trials of single ice chips, NTL via tsp and cup sip, and 1/8 tsps of purees. Patient spit out ice chips x2 upon presentation of bolus. During PO trials of NTL, patient had delayed initiation of swallow trigger, audible swallow, changes in vocal quality, and frequent throat clearing with increased WOB. Patient had no swallow trigger on 1/8 tsps of purees despite max cues and bolus had to be suctioned out by this SLP. At this time, patient appears at high risk for aspiration and does not appear safe for PO intake. Nephew present and reported \"I don't think she'd want a feeding tube\" when provided with education regarding dysphagia, s/sx of aspiration, risks for pneumonia, and SLP recs. Recommend palliative care consult to further determine POC. RN aware. SLP to follow depending on POC. Thank you for the consult.     Recommendations - Diet: Diet / Liquid Recommendation: Continue strict NPO (Nephew [who is POA] reporting he doesn't think patient would want a feeding tube)              " "2.) Recommend palliative care consult to further determine patient's wishes and POC     3.) If patient/POA wish for patient to eat despite risks, consider start of NTFL diet, as it is safest diet to MINIMIZE, but NOT PREVENT aspiration risks.                           Strategies: To be assessed                           Medication Administration: Medication Administration: Via Gastric Tube  Plan of Care: Will benefit from Speech Therapy 3 times per week  Post-Acute Therapy: Discharge to a transitional care facility for continued skilled therapy services.    See \"Rehab Therapy-Acute\" Patient Summary Report for complete documentation.   "

## 2017-05-09 NOTE — IP AVS SNAPSHOT
[employer] Access Code: Activation code not generated  Current [employer] Status: Patient Declined    [employer]  A secure, online tool to manage your health information     [employer]® is a secure, online tool that connects you to your personalized health information from the privacy of your home -- day or night - making it very easy for you to manage your healthcare. Once the activation process is completed, you can even access your medical information using the [employer] lito, which is available for free in the Apple Lito store or Google Play store.     [employer] provides the following levels of access (as shown below):   My Chart Features   [facility] Primary Care Doctor [facility]  Specialists [facility]  Urgent  Care Non-[facility]  Primary Care  Doctor   Email your healthcare team securely and privately 24/7 X X X X   Manage appointments: schedule your next appointment; view details of past/upcoming appointments X      Request prescription refills. X      View recent personal medical records, including lab and immunizations X X X X   View health record, including health history, allergies, medications X X X X   Read reports about your outpatient visits, procedures, consult and ER notes X X X X   See your discharge summary, which is a recap of your hospital and/or ER visit that includes your diagnosis, lab results, and care plan. X X       How to register for [employer]:  1. Go to  [email or web address].  2. Click on the Sign Up Now box, which takes you to the New Member Sign Up page. You will need to provide the following information:  a. Enter your [employer] Access Code exactly as it appears at the top of this page. (You will not need to use this code after you’ve completed the sign-up process. If you do not sign up before the expiration date, you must request a new code.)   b. Enter your date of birth.   c. Enter your home email address.   d. Click Submit, and follow the next screen’s instructions.  3. Create a [employer] ID.  This will be your [employer] login ID and cannot be changed, so think of one that is secure and easy to remember.  4. Create a [employer] password. You can change your password at any time.  5. Enter your Password Reset Question and Answer. This can be used at a later time if you forget your password.   6. Enter your e-mail address. This allows you to receive e-mail notifications when new information is available in [employer].  7. Click Sign Up. You can now view your health information.    For assistance activating your [employer] account, call [phone]

## 2017-05-09 NOTE — ED NOTES
Pt presents via EMS from [facility] with reports of failure to thrive. Facility reports refusal to eat x2 days. Pt denies any pain or n/v. Pt has hx of dementia and is oriented to self only. Pre arrival VS: 142/66, 75, 16, 95%RA.

## 2017-05-09 NOTE — IP AVS SNAPSHOT
5/13/2017    Alison Atkinson  [address] [address]    Dear [place]:    [facility] wants to ensure your discharge home is safe and you or your loved ones have had all of your questions answered regarding your care after you leave the hospital.    Below is a list of resources and contact information should you have any questions regarding your hospital stay, follow-up instructions, or active medical symptoms.    Questions or Concerns Regarding… Contact   Medical Questions Related to Your Discharge  (7 days a week, 8am-5pm) Contact a Nurse Care Coordinator   [phone]   Medical Questions Not Related to Your Discharge  (24 hours a day / 7 days a week)  Contact the Nurse Health Line   [phone]    Medications or Discharge Instructions Refer to your discharge packet   or contact your [facility] Primary Care Provider   [phone]   Follow-up Appointment(s) Schedule your appointment via R&V   or contact Scheduling [phone]   Billing Review your statement via R&V  or contact Billing [phone]   Medical Records Review your records via R&V   or contact Medical Records [phone]     You may receive a telephone call within two days of discharge. This call is to make certain you understand your discharge instructions and have the opportunity to have any questions answered. You can also easily access your medical information, test results and upcoming appointments via the R&V free online health management tool. You can learn more and sign up at [employer]/R&V. For assistance setting up your R&V account, please call [phone].    Once again, we want to ensure your discharge home is safe and that you have a clear understanding of any next steps in your care. If you have any questions or concerns, please do not hesitate to contact us, we are here for you. Thank you for choosing [facility] for your healthcare needs.    Sincerely,    Your [facility] Healthcare Team

## 2017-05-10 PROBLEM — R13.10 DYSPHAGIA: Status: ACTIVE | Noted: 2017-01-01

## 2017-05-10 PROBLEM — N18.2 CKD (CHRONIC KIDNEY DISEASE), STAGE II: Status: ACTIVE | Noted: 2017-01-01

## 2017-05-10 NOTE — RESPIRATORY CARE
COPD EDUCATION by COPD CLINICAL EDUCATOR  5/10/2017 at 7:52 AM by Betsy Rodriguez     Patient reviewed by COPD education team. Patient does not qualify for COPD program.

## 2017-05-10 NOTE — PALLIATIVE CARE
"Palliative Care follow-up  Get called PC RN, said the [facility] has used [facility]. PC RN discussed that the patient is \"very Yazidi\" per Get that [facility] would be a good fit. Get runs a ranch and would like to set up a time with the SW to do choice form, PC RN will update Davey to schedule a time with him.    Called and left msg for Davey to schedule a time with Get for Choice form      Updated:   Susie Hospitalist CHAPO    Plan:   Discharge to the [facility] with [facility].     Thank you for allowing Palliative Care to participate in this patient's care. Please feel free to call [phone] with any questions or concerns.  "

## 2017-05-10 NOTE — PROGRESS NOTES
Assumed care of pt. Alert and orientedx1, pt. Baseline dementia. Reoriented pt. And provided safe and comforting environment. Family at bedside updated. Initially on NPO. Denies any pain/discomfort at this moment. Needs attended.

## 2017-05-10 NOTE — PALLIATIVE CARE
Palliative Care   Called Get, will meet at 1300 today to discuss POC          Thank you for allowing Palliative Care to participate in this patient's care. Please feel free to call [phone] with any questions or concerns.

## 2017-05-10 NOTE — CARE PLAN
Problem: Safety  Goal: Will remain free from falls  Outcome: PROGRESSING AS EXPECTED  Fall prec in place. Bed alarm is on. Pt. Does not call when trying to get out of bed, hourly rounding in place.     Problem: Knowledge Deficit  Goal: Knowledge of disease process/condition, treatment plan, diagnostic tests, and medications will improve  Outcome: PROGRESSING AS EXPECTED  POC discussed with the pt. And family member, pt. On DNR but now able to take on a diet per SLP.     Problem: Respiratory:  Goal: Respiratory status will improve  Outcome: PROGRESSING AS EXPECTED  Pt. On oxygen via nC tolerates well.

## 2017-05-10 NOTE — CONSULTS
"Reason for PC Consult: Advance Care Planning    Consulted by:   Dr. Augilar    Assessment:  General:   81 year old female admitted for pneumonia and failure to thrive on 5/9/17. Pt has a history of severe dementia, parkinson's, HTN, Depression and anxiety. Pt has been living at [facility] in [place] for the past 3 years.    Dyspnea: Denies, 92% on 2L NC  Last BM: 05/09/17  Pain: Denies  Depression: Mood appropriate for situation      Spiritual:  Is Confucianism or spirituality important for coping with this illness? Yes- Notified  for a visit from    Has a  or spiritual provider visit been requested? Yes    Palliative Performance Scale: 40%    Advance Directive: DPOA, Living Will  DPOA: Yes, NELSONCamilo Adrián China [phone]  POLST: None on File    Code Status: DNR     Outcome:  PC RN introduced self and role of PC. Get, Karly (pt's niece) and Georgina (pt's sister-in-law) present at bedside. Get provided PC RN with a copy of DPOA and Declaration paperwork, files scanned into EMR. PC RN discussed Get's understanding of pt's clinical picture, he is aware of everything and is understanding of current treatment plan. PC RN discussed cortack/artificial nutrition, Get does not want to add that to POC, PC RN agrees and discussed hospice in detail. Get would like pt to return back to the [facility] as that is what she knows as home on hospice, he does know that other residents have received hospice there and he will call for a suggestion for hospice provider. PC RN discussed SW will follow up with choice form and we can arrange everything before discharge. Get is understanding of pt's dementia and end-of-life care and is supportive of pt and wants the best care possible, which he feels she receives at the [facility]. PC RN asked if pt would like to see a , Get says that pt is \"very much a Alevism and would like to see a \", PC RN left ms for a visit from . PC " RN provided Get with contact card and encouraged to call once he knows which hospice provider the Екатер[place] Watts suggests or with questions or concerns.     Updated:   LINCOLN Mariscal, Susie Hospitalist RN    Plan:   Discharge back to [facility]ice.    Thank you for allowing Palliative Care to participate in this patient's care. Please feel free to call [phone] with any questions or concerns.

## 2017-05-10 NOTE — PROGRESS NOTES
Hospital Medicine Progress Note, Adult, Complex               Author: Naila Aguilar Date & Time created: 5/10/2017  4:22 PM     CC: Cough, weakness (noted by others)    Interval History:  81F hx parkinson's complicated by advanced dementia, chronic anemia, living at [facility], noted to be weak and having cough so sent to hospital found to have PNA, likely from aspiration.    Review of Systems:  Review of Systems   Constitutional: Positive for malaise/fatigue. Negative for fever and chills.   HENT: Negative for sore throat.    Respiratory: Positive for cough. Negative for shortness of breath.    Cardiovascular: Negative for chest pain and palpitations.   Gastrointestinal: Negative for heartburn, nausea, vomiting, abdominal pain, diarrhea and blood in stool.   Genitourinary: Negative for dysuria and frequency.   Musculoskeletal: Negative for myalgias, back pain and falls.   Neurological: Positive for weakness. Negative for dizziness, focal weakness and headaches.   Psychiatric/Behavioral: Positive for memory loss. Negative for depression and hallucinations.   All other systems reviewed and are negative.      Physical Exam:  Physical Exam   Constitutional: She is oriented to person, place, and time. She appears well-developed and well-nourished.   HENT:   Head: Normocephalic and atraumatic.   Neck: No JVD present. No thyromegaly present.   Cardiovascular: Normal rate and regular rhythm.    No murmur heard.  Pulmonary/Chest: Effort normal. No respiratory distress. She has no wheezes.   Abdominal: She exhibits no distension. There is no tenderness.   Musculoskeletal: She exhibits no edema or tenderness.   Neurological: She is alert and oriented to person, place, and time. No cranial nerve deficit.   Psychiatric:   Confused, poor historian but very pleasant       Labs:        Invalid input(s): [id number]  Recent Labs      05/09/17 0758   TROPONINI  <0.01   BNPBTYPENAT  43     Recent Labs      05/09/17 0758   05/10/17   0407   SODIUM  138  143   POTASSIUM  5.3  4.8   CHLORIDE  105  116*   CO2  21  20   BUN  74*  43*   CREATININE  2.72*  1.39   CALCIUM  9.7  9.1     Recent Labs      05/09/17   0758  05/10/17   0407   ALTSGPT  10   --    ASTSGOT  14   --    ALKPHOSPHAT  116*   --    TBILIRUBIN  0.4   --    GLUCOSE  162*  91     Recent Labs      05/09/17   0758  05/10/17   0407   RBC  3.14*  2.74*   HEMOGLOBIN  9.2*  8.2*   HEMATOCRIT  29.6*  27.4*   PLATELETCT  455*  341   PROTHROMBTM  14.6   --    APTT  28.7   --    INR  1.10   --      Recent Labs      05/09/17   0758  05/10/17   0407   WBC  9.0  5.7   NEUTSPOLYS  84.30*  85.20*   LYMPHOCYTES  8.30*  9.60*   MONOCYTES  6.90  1.70   EOSINOPHILS  0.10  0.90   BASOPHILS  0.30  1.70   ASTSGOT  14   --    ALTSGPT  10   --    ALKPHOSPHAT  116*   --    TBILIRUBIN  0.4   --            Hemodynamics:  Temp (24hrs), Av.4 °C (97.5 °F), Min:36.2 °C (97.2 °F), Max:36.5 °C (97.7 °F)  Temperature: 36.4 °C (97.6 °F)  Pulse  Av.9  Min: 62  Max: 96   Blood Pressure : 123/55 mmHg     Respiratory:    Respiration: 18, Pulse Oximetry: 92 %        RUL Breath Sounds: Diminished, RML Breath Sounds: Diminished, RLL Breath Sounds: Diminished, ANANDA Breath Sounds: Crackles;Diminished, LLL Breath Sounds: Diminished  Fluids:  No intake or output data in the 24 hours ending 05/10/17 1622     GI/Nutrition:  Orders Placed This Encounter   Procedures   • DIET ORDER     Standing Status: Standing      Number of Occurrences: 1      Standing Expiration Date:      Order Specific Question:  Diet:     Answer:  Full Liquid [11]      Comments:  Pt eating despite risks; crush meds in applesauce please     Order Specific Question:  Consistency/Fluid modifications:     Answer:  Nectar Thick [2]     Order Specific Question:  Miscellaneous modifications:     Answer:  SLP - 1:1 Supervision by Nursing [21]     Medical Decision Making, by Problem:  Active Hospital Problems    Diagnosis   • *Pneumonia [J18.9]- likely  due to aspiration.  She has a remote smoking history  -Unasyn / Azithromycin  -Allowing diet due to family preference for no NGT  -RT protocol     • CKD (chronic kidney disease), stage II [N18.2] - Cr has improved  -Monitor Cr Daily  -Watch UOP  -Hold nephrotoxins     • Dysphagia [R13.10] - likely related to parkinsons / dementia.  -Failed SLP today  -Family decided no feeding tube  -Discussed goals of care, they would not want ICu transfer/rapid response understanding risks and likelihood given that she is aspirating     • Chronic respiratory failure with hypoxia (CMS-[facility]) [J96.11]- 2L at night only  -O2 PRN     • Anemia due to chronic blood loss [D50.0]- no e/o blood loss now  -CBC in Am  -Transfuse if <7     • Parkinson disease (CMS-[facility]) [G20] c/b advanced dementia  -Remeron  -Aricept 10 nightly  -Celexa     • DNR (do not resuscitate) [Z66]  -Discussed with family at bedside     • Hypertension [I10]  -ASA  -Atorva 40       Dispo: Continue abx for now, no feeding tube  -Heparin ok    40 minutes were spent discussing course and plan with family at bedside, to address code status and goals of care re: feeding tube.  No ICU transfer or rapid response call.  This was in addition to the time of the initial visit with the patient for total time of 65min.  All questions answered.  Greater than 50% of this time was at the patient's bedside.      Core Measures

## 2017-05-10 NOTE — H&P
PRIMARY CARE PROVIDER:  DIANA Vidal    CHIEF COMPLAINT:  Generalized weakness and poor intake.    HISTORY OF PRESENT ILLNESS:  The patient is a 81-year-old female with history   of dementia and Parkinson's disease, who currently resides at an assisted   living facility.  Her nephew at her bedside, who is her POA, is providing most   of the history.  Apparently, the patient has been having decreased oral   intake and increased lethargy over the past 2-3 days, which progressively got   worse.  The paramedics were called today to evaluate her and apparently she   was noted to be hypoxic and she was subsequently transferred to the emergency   room.  The patient denies any pain.  She is oriented to self only and is   unable to provide any further history.    REVIEW OF SYSTEMS:  As above, otherwise limited by the patient's dementia.    PAST MEDICAL HISTORY:  Significant for:  1.  Severe dementia.  2.  Parkinson's disease.  3.  Hypertension.  4.  Depression/anxiety.  5.  Dyslipidemia.    PAST SURGICAL HISTORY:  Cataract surgery.    SOCIAL HISTORY:  She is an ex-smoker.  No alcohol or illicit drug use.    FAMILY HISTORY:  Reviewed, not pertinent to the presenting problem.    HOME MEDICATIONS:  Aspirin 325 mg daily, calcium and vitamin D supplement,   Celexa 20 mg daily, Colace 100 mg b.i.d., Aricept 10 mg daily, iron supplement   daily, ibuprofen as needed, lisinopril/hydrochlorothiazide 20/12.5 mg daily,   lorazepam 0.5 mg 3 times a day as needed, lovastatin 40 mg daily, Remeron 15   mg at bedtime, and trazodone 50 mg at bedtime as needed.    PHYSICAL EXAMINATION:  GENERAL:  The patient is alert.  She is oriented to self only.  VITAL SIGNS:  Temperature is 36.4, pulse 78, respiratory rate 16, blood   pressure 129/43, and pulse oximetry is 95% on 4 liters.  HEAD AND NECK:  Pupils equal.  Supple neck.  No jugular venous distention.    Oropharynx is clear.  No cervical lymphadenopathy.  HEART:  Regular rate and  rhythm.  Normal S1, S2.  She has grade II systolic   ejection murmur.  No rubs or gallops.  LUNGS:  She has bibasilar rales and scattered rhonchi.  No chest wall   tenderness.  ABDOMEN:  Soft, nontender.  Bowel sounds are positive.  No hepatosplenomegaly.  EXTREMITIES:  No edema, no clubbing, no cyanosis.  NEUROLOGIC:  No focal deficits.  SKIN:  No rash.    DIAGNOSTICS:  White blood cell count is 9.0, hemoglobin 9.2, hematocrit 29.6,   and platelet count 455.  Sodium 138, potassium 5.3, chloride 105, bicarbonate   21, glucose 162, BUN 74, and creatinine 2.72.  Alkaline phosphatase 116.    Lactic acid 1.3.  Troponin I less than 0.01.  BNP is 43.  INR is 1.1 and PTT   28.7.  Urinalysis reveals small leukocyte esterase, 0-2 white blood cells.    TSH is 4.0 and free T4 is 1.06.  Chest x-ray reveals right upper lobe opacity,   likely represents pneumonia, patchy bibasilar opacities may represent   atelectasis or pneumonitis, prominent atherosclerotic plaque, stable blunting   of left costophrenic angle may be related to pleural thickening.  EKG reveals   sinus rhythm with no acute ischemic changes.    ASSESSMENT:  1.  Pneumonia.  2.  Acute respiratory failure with hypoxia.  3.  Acute kidney injury, likely related to poor oral intake and dehydration.  4.  Severe protein-calorie malnutrition.  5.  Acute on chronic anemia.  6.  History of Parkinson's disease.  7.  Severe dementia.    PLAN:  The patient will be admitted.  She will be started on IV fluids for   hydration.  She will be started on Unasyn and azithromycin.  We will follow up   on her culture results and adjust accordingly.    We will ask for speech therapy evaluation with some concern she might be   aspirating and we will advance her diet per speech therapy evaluation.    Discussed with her nephew who is her POA and he would like temporary feeding   tube if this is recommended by speech therapy.    We will continue with her Aricept.    We will continue with her  Remeron and citalopram.    We will try to minimize sedating agents otherwise.    We will hold her lisinopril and hydrochlorothiazide and monitor her renal   function with hydration.    We will start on heparin for DVT prophylaxis.    Depending on speech therapy recommendations, we will supplement her diet with   Boost or Ensure.    Overall prognosis is guarded.    Code status discussed with the patient's nephew who reports is her POA and the   patient is DNR.       ____________________________________     MD DAVID CASTRO / NTS    DD:  05/09/2017 13:58:22  DT:  05/09/2017 18:30:30    D#:  [id number]  Job#:  [id number]    cc: Madisyn ORTIZ

## 2017-05-10 NOTE — CARE PLAN
Problem: Safety  Goal: Will remain free from falls  Safety precautions in place, bed alarm on, call light within reach, bed in lowest locked position, communication board updated, hourly rounding in place.     Problem: Psychosocial Needs:  Goal: Level of anxiety will decrease  Patient kept in calm, quiet environment, patient currently resting in bed.

## 2017-05-10 NOTE — PROGRESS NOTES
2 RN skin assessment performed with CHAPO Branch. Skin intact. Redness on sacral area, blanching. Bilateral heel blanching. Ear free from redness.

## 2017-05-10 NOTE — CARE PLAN
Problem: Nutritional:  Goal: Achieve adequate nutritional intake  Patient will consume 50 % of meals  Outcome: NOT MET

## 2017-05-10 NOTE — CARE PLAN
Problem: Safety  Goal: Will remain free from falls  Outcome: PROGRESSING AS EXPECTED  Pt. A&OX1. Bed alarm ON, bed kept low and locked, call light within reach, assisted as necessary.    Problem: Respiratory:  Goal: Respiratory status will improve  Outcome: PROGRESSING AS EXPECTED  Admitted d/t pneumonia. Diet order per SLP. Suction at bedside.

## 2017-05-10 NOTE — DIETARY
Nutrition Note  Patient is at high nutrition risk secondary to mental status   NPO diet low BMI and age   Palliative care consult is appropriate   Unsure of long term nutrition plan at this time   Speech evaluation completed   Advance diet as able   Monitor for tolerance Question patiens ability to consume adequate nutrition to meet needs   Current BMI is 16.15 high risk range    BUN is elevated   IV hydration as appropriate   Weight loss history amount is unknown     RD will continue to follow for POC

## 2017-05-10 NOTE — THERAPY
"Speech Language Therapy dysphagia treatment completed.   Functional Status:  Patient seen this date for dysphagia tx session, as patient is still NPO without a source of nutrition. Patient's nephew, who is POA, present at bedside for tx session. Patient awake, alert, and with some improvement noted in overall status compared to yesterday. Patient consumed PO trials of single ice chips, NTL via tsp and cup sip, and purees. Patient had no overt s/sx of aspiration on ice chips. PO trials of NTL resulted in delayed initiation of swallow trigger, audible swallow, effortful swallow, and wet/gurgly vocal quality x2. On PO trials of purees via 1/4 tsp, patient had delayed initiation of swallow trigger of up to 10 seconds on first bolus, and second bolus needed to be suctioned out with Yankouer, as patient did not trigger swallow despite max cues and began talking with bolus in oral cavity, causing her to cough. Laryngeal elevation palpated as weak. At this time, although patient demonstrates some improvement from yesterday, patient still appears at high risk for aspiration and does not appear safe for PO intake. Lengthy discussion with nephew, who is POA, regarding SLP recs, dysphagia, risks for aspiration and pneumonia, and NPO/TF vs starting NTFL despite aspiration risks and nephew reporting \"She wouldn't want a tube. That would just put her under more trauma and I think she would just get depressed and give up anyway. I'd rather have her happy and eating even though she could get pneumonia and worsen.\" Discussed with RN and MD and per MD, will start NTFL despite risks, as it is safest diet to minimize, but not prevent aspiration. SLP is following.     Recommendations: 1.) Recommend patient continue NPO with placement of Cortrak for supplemental nutrition.   2.) However, nephew, who is POA, declining and requesting for patient to eat despite risks. Per MD, will start NTFL diet despite risks, as it is safest diet to " "minimize, but not prevent aspiration    3.) Recommend palliative care consult to further determine patient's wishes and POC.   Plan of Care: Will benefit from Speech Therapy 3 times per week  Post-Acute Therapy: Discharge to a transitional care facility for continued skilled therapy services.    See \"Rehab Therapy-Acute\" Patient Summary Report for complete documentation.     "

## 2017-05-11 NOTE — DISCHARGE PLANNING
Medical Social Work  Talked to Get CHIRINOS about his choice for Hospice.  He stated that several other patients where his aunt live, Екатерина Watts are using [facility] and requested to use them.    Faxed Choice to [facility].

## 2017-05-11 NOTE — DISCHARGE PLANNING
Received voicemail from [facility]. Contacted [facility] spoke to Cari they will see patient tomorrow morning. Notified LINCOLN Mariscal via phone.

## 2017-05-11 NOTE — CARE PLAN
Problem: Safety  Goal: Will remain free from injury  Safety precautions in place, call light within reach, bed in lowest locked position, communication board updated, hourly rounding in place.     Problem: Skin Integrity  Goal: Risk for impaired skin integrity will decrease  Patient kept dry and free of moisture, sheets under patient kept flat and free of wrinkles.

## 2017-05-11 NOTE — PROGRESS NOTES
Hospital Medicine Progress Note, Adult, Complex               Author: Naila Aguilar Date & Time created: 5/11/2017  12:55 PM     CC: Cough, weakness (noted by others)    Interval History:  81F hx parkinson's complicated by advanced dementia, chronic anemia, living at [facility], noted to be weak and having cough so sent to hospital found to have PNA, likely from aspiration.  5/11: Discussion w nephew at bedside, DC non-necessary medications, continue abx.  Urine + Klebsiella    Review of Systems:  Review of Systems   Constitutional: Positive for malaise/fatigue. Negative for fever and chills.   HENT: Negative for sore throat.    Respiratory: Positive for cough. Negative for shortness of breath.    Cardiovascular: Negative for chest pain and palpitations.   Gastrointestinal: Negative for heartburn, nausea, vomiting, abdominal pain, diarrhea and blood in stool.   Genitourinary: Negative for dysuria and frequency.   Musculoskeletal: Negative for myalgias, back pain and falls.   Neurological: Positive for weakness. Negative for dizziness, focal weakness and headaches.   Psychiatric/Behavioral: Positive for memory loss. Negative for depression and hallucinations.   All other systems reviewed and are negative.      Physical Exam:  Physical Exam   Constitutional: She is oriented to person, place, and time. She appears well-developed and well-nourished.   Comfortable   HENT:   Head: Normocephalic and atraumatic.   Neck: No JVD present. No thyromegaly present.   Cardiovascular: Normal rate and regular rhythm.    No murmur heard.  Pulmonary/Chest: Effort normal. No respiratory distress. She has no wheezes.   Abdominal: She exhibits no distension. There is no tenderness.   Musculoskeletal: She exhibits no edema or tenderness.   Neurological: She is alert and oriented to person, place, and time. No cranial nerve deficit.   Psychiatric:   Poor historian but very pleasant, not agitated       Labs:        Invalid input(s):  [id number]  Recent Labs      17   TROPONINI  <0.01   BNPBTYPENAT  43     Recent Labs      05/09/17   0758  05/10/17   0407  05/11/17   0230   SODIUM  138  143  145   POTASSIUM  5.3  4.8  4.1   CHLORIDE  105  116*  117*   CO2  21  20  17*   BUN  74*  43*  31*   CREATININE  2.72*  1.39  1.09   PHOSPHORUS   --    --   2.9   CALCIUM  9.7  9.1  9.5     Recent Labs      05/09/17   0758  05/10/17   0407  05/11/17   0230   ALTSGPT  10   --    --    ASTSGOT  14   --    --    ALKPHOSPHAT  116*   --    --    TBILIRUBIN  0.4   --    --    GLUCOSE  162*  91  76     Recent Labs      05/09/17   0758  05/10/17   0407  05/11/17   0230   RBC  3.14*  2.74*  2.61*   HEMOGLOBIN  9.2*  8.2*  7.7*   HEMATOCRIT  29.6*  27.4*  25.8*   PLATELETCT  455*  341  328   PROTHROMBTM  14.6   --    --    APTT  28.7   --    --    INR  1.10   --    --      Recent Labs      05/09/17   0758  05/10/17   0407  05/11/17   0230   WBC  9.0  5.7  4.5*   NEUTSPOLYS  84.30*  85.20*  64.20   LYMPHOCYTES  8.30*  9.60*  19.60*   MONOCYTES  6.90  1.70  11.20   EOSINOPHILS  0.10  0.90  4.20   BASOPHILS  0.30  1.70  0.40   ASTSGOT  14   --    --    ALTSGPT  10   --    --    ALKPHOSPHAT  116*   --    --    TBILIRUBIN  0.4   --    --            Hemodynamics:  Temp (24hrs), Av.7 °C (98 °F), Min:36.4 °C (97.5 °F), Max:36.8 °C (98.2 °F)  Temperature: 36.8 °C (98.2 °F)  Pulse  Av.9  Min: 62  Max: 96   Blood Pressure : 125/54 mmHg     Respiratory:    Respiration: 20, Pulse Oximetry: 96 %        RUL Breath Sounds: Diminished, RML Breath Sounds: Diminished, RLL Breath Sounds: Diminished, ANANDA Breath Sounds: Crackles;Diminished, LLL Breath Sounds: Diminished  Fluids:    Intake/Output Summary (Last 24 hours) at 17 1255  Last data filed at 05/10/17 1900   Gross per 24 hour   Intake    675 ml   Output      0 ml   Net    675 ml        GI/Nutrition:  Orders Placed This Encounter   Procedures   • DIET ORDER     Standing Status: Standing      Number of  Occurrences: 1      Standing Expiration Date:      Order Specific Question:  Diet:     Answer:  Full Liquid [11]      Comments:  Pt eating despite risks; crush meds in applesauce please     Order Specific Question:  Consistency/Fluid modifications:     Answer:  Nectar Thick [2]     Order Specific Question:  Miscellaneous modifications:     Answer:  SLP - 1:1 Supervision by Nursing [21]     Medical Decision Making, by Problem:  Active Hospital Problems    Diagnosis   • *Pneumonia [J18.9]- likely due to aspiration.  She has a remote smoking history  -Change Unasyn to Ceftriaxone to also cover Klebsiella (urine)  -Continue Azithromycin  -Allowing diet due to family preference for no NGT  -RT protocol     • CKD (chronic kidney disease), stage II [N18.2] - Cr has improved  -Monitor Cr Daily  -Watch UOP  -Hold nephrotoxins     • Dysphagia [R13.10] - likely related to parkinsons / dementia.  -Failed SLP  -Family decided no feeding tube, will DC w hospice  -Discussed goals of care, aim for comfort.  Hospice to consult     • Chronic respiratory failure with hypoxia (CMS-[facility]) [J96.11]- 2L at night only  -O2 PRN     • Anemia due to chronic blood loss [D50.0]- no e/o blood loss now  -CBC in Am  -Transfuse if <7  -DC ASA/Heparin (d/w nephew, no benefit for comfort)     • Parkinson disease (CMS-[facility]) [G20] c/b advanced dementia  -Remeron  -Aricept 10 nightly  -Celexa     • Klebsiella UTI  -Ceftriaxone x7d total --> omnicef on DC     • Hypertension [I10]  -ASA  -Atorva 40       Dispo: Continue abx for now and to complete course on DC, no feeding tube    40 minutes were spent discussing course and plan with family at bedside.  This was in addition to the time of the initial visit with the patient for total time of 65min.  All questions answered.  Greater than 50% of this time was at the patient's bedside.      Core Measures

## 2017-05-11 NOTE — PROGRESS NOTES
PALLIATIVE CARE FOLLOW UP:  Received call from [facility] regarding discharge planning. Returned call and he has already spoken with LINCOLN Mariscal. Get encouraged to call with any questions, needs or concerns.     Thank you for allowing Palliative Care to follow this patient. Please contact us at  with any questions.

## 2017-05-11 NOTE — CARE PLAN
Problem: Communication  Goal: The ability to communicate needs accurately and effectively will improve  Intervention: [place] patient and significant other/support system to call light to alert staff of needs  Providing frequent reminders regarding patient safety and associated interventions (bed alarm, sitting up to eat, etc).      Problem: Psychosocial Needs:  Goal: Level of anxiety will decrease  Intervention: Identify and develop with patient strategies to cope with anxiety triggers  Providing emotional support as well and medication PRN and per MAR to maintain a calm and safe environment.

## 2017-05-11 NOTE — PROGRESS NOTES
Received bedside report and accepted care of patient.  Patient currently resting in bed in no visible or stated distress.  Bed controls on and bed in locked position.  Bed alarm on.  Call light and personal possessions within reach.  Plan of care to include keeping patient safe, calm and comfortable until discharge back to previous assisted living situation with palliative care .  Patient oriented to self only at this time and is unable to verbalize agreement with plan of care. Will continue to update notes/plan of care as needed throughout shift.

## 2017-05-12 NOTE — DISCHARGE PLANNING
Medical Social Work  Voice message from [facility], requesting I change the transport time until 11:00 as the Hospital Bed won't be there till 10:00 at the earliest.    PC to [facility], message left requesting transport time be changed to 11:00

## 2017-05-12 NOTE — DISCHARGE PLANNING
Medical Social Work  PC to [facility]- [phone] to Cari, told me they will have an admitting nurse available tomorrow morning and if I could set up [facility] for 9:00.  Also requested that they be called when patient is being transported so they could have a nurse there when she arrives.     Faxed [facility] and Transport Communication form to [facility] for a tentative transport time of 9:00 to [facility].    PC to [facility]: called to tell them that the patient will be discharged tomorrow at 9:00 and being brought to them by [facility]

## 2017-05-12 NOTE — PROGRESS NOTES
Hospital Medicine Progress Note, Adult, Complex               Author: Naila Aguilar Date & Time created: 5/12/2017  2:02 PM     CC: Cough, weakness (noted by others)    Interval History:  81F hx parkinson's complicated by advanced dementia, chronic anemia, living at [facility], noted to be weak and having cough so sent to hospital found to have PNA, likely from aspiration.  5/11: Discussion w nephew at bedside, DC non-necessary medications, continue abx.  Urine + Klebsiella  5/12: Weaned to RA, appetite improving.  Set up with hospice and prepping for DC in AM.    Review of Systems:  Review of Systems   Constitutional: Positive for malaise/fatigue. Negative for fever and chills.   HENT: Negative for sore throat.    Respiratory: Negative for cough (Improved) and shortness of breath.    Cardiovascular: Negative for chest pain and palpitations.   Gastrointestinal: Negative for heartburn, nausea, vomiting, abdominal pain, diarrhea and blood in stool.   Genitourinary: Negative for dysuria and frequency.   Musculoskeletal: Negative for myalgias, back pain and falls.   Neurological: Positive for weakness. Negative for dizziness, focal weakness and headaches.   Psychiatric/Behavioral: Positive for memory loss. Negative for depression and hallucinations.   All other systems reviewed and are negative.      Physical Exam:  Physical Exam   Constitutional: She is oriented to person, place, and time. She appears well-developed and well-nourished.   Comfortable   HENT:   Head: Normocephalic and atraumatic.   Neck: No JVD present. No thyromegaly present.   Cardiovascular: Normal rate and regular rhythm.    No murmur heard.  Pulmonary/Chest: Effort normal. No respiratory distress. She has no wheezes.   Abdominal: She exhibits no distension. There is no tenderness.   Musculoskeletal: She exhibits no edema or tenderness.   Neurological: She is alert and oriented to person, place, and time. No cranial nerve deficit.   Psychiatric:   Poor  historian but very pleasant, not agitated       Labs:        Invalid input(s): [id number]      Recent Labs      05/10/17   0407  17   0230   SODIUM  143  145   POTASSIUM  4.8  4.1   CHLORIDE  116*  117*   CO2  20  17*   BUN  43*  31*   CREATININE  1.39  1.09   PHOSPHORUS   --   2.9   CALCIUM  9.1  9.5     Recent Labs      05/10/17   0407  17   0230   GLUCOSE  91  76     Recent Labs      05/10/17   0407  17   0230   RBC  2.74*  2.61*   HEMOGLOBIN  8.2*  7.7*   HEMATOCRIT  27.4*  25.8*   PLATELETCT  341  328     Recent Labs      05/10/17   0407  17   0230   WBC  5.7  4.5*   NEUTSPOLYS  85.20*  64.20   LYMPHOCYTES  9.60*  19.60*   MONOCYTES  1.70  11.20   EOSINOPHILS  0.90  4.20   BASOPHILS  1.70  0.40           Hemodynamics:  Temp (24hrs), Av.8 °C (98.2 °F), Min:36.4 °C (97.5 °F), Max:37.4 °C (99.4 °F)  Temperature: 36.5 °C (97.7 °F)  Pulse  Av.2  Min: 62  Max: 96   Blood Pressure : 140/68 mmHg     Respiratory:    Respiration: 16, Pulse Oximetry: 97 %        RUL Breath Sounds: Diminished, RML Breath Sounds: Diminished, RLL Breath Sounds: Diminished, ANANDA Breath Sounds: Diminished, LLL Breath Sounds: Diminished  Fluids:    Intake/Output Summary (Last 24 hours) at 17 1402  Last data filed at 17 0900   Gross per 24 hour   Intake    360 ml   Output      0 ml   Net    360 ml        GI/Nutrition:  Orders Placed This Encounter   Procedures   • DIET ORDER     Standing Status: Standing      Number of Occurrences: 1      Standing Expiration Date:      Order Specific Question:  Diet:     Answer:  Full Liquid [11]      Comments:  Pt eating despite risks; crush meds in applesauce please     Order Specific Question:  Consistency/Fluid modifications:     Answer:  Nectar Thick [2]     Order Specific Question:  Miscellaneous modifications:     Answer:  SLP - 1:1 Supervision by Nursing [21]     Medical Decision Making, by Problem:  Active Hospital Problems    Diagnosis   • *Pneumonia  [J18.9]- likely due to aspiration.  She has a remote smoking history  -Continue Azithro/Ceftriaxone to also cover Klebsiella (urine)  -Allowing diet due to family preference for no NGT  -RT protocol     • CKD (chronic kidney disease), stage II [N18.2] - Cr has improved  -Monitor Cr Daily  -Watch UOP  -Hold nephrotoxins     • Dysphagia [R13.10] - likely related to parkinsons / dementia.  -Failed SLP  -Family decided no feeding tube, will DC w hospice  -Discussed goals of care, aim for comfort.  Hospice to consult     • Chronic respiratory failure with hypoxia (CMS-HCC) [J96.11]- 2L at night only  -O2 PRN     • Anemia due to chronic blood loss [D50.0]- no e/o blood loss now  -DC labs  -DC iron (can upset stomach)     • Parkinson disease (CMS-HCC) [G20] c/b advanced dementia  -Remeron  -Aricept 10 nightly  -Celexa     • Klebsiella UTI  -Ceftriaxone x7d total --> omnicef on DC     • Hypertension [I10]  -ASA  -Atorva 40       Dispo: Continue abx for now and to complete course on DC, no feeding tube    40 minutes were spent discussing course and plan with family at bedside.  This was in addition to the time of the initial visit with the patient for total time of 65min.  All questions answered.  Greater than 50% of this time was at the patient's bedside.      Core Measures

## 2017-05-12 NOTE — DOCUMENTATION QUERY
"DOCUMENTATION QUERY    PROVIDERS: Please select “Cosign w/ note”to reply to query.    To better represent the severity of illness of your patient, please review the following information and exercise your independent professional judgment in responding to this query.     There is conflicting information documented in the medical record. \"Acute respiratory failure with hypoxia\" is documented in the History and Physical and \"Chronic respiratory failure with hypoxia\" is documented in the Progress Notes. Based upon the clinical findings, risk factors, and treatment, can the relationship between these two conditions be further specified?    • Acute respiratory failure with hypoxia  • Acute on chronic respiratory failure with hypoxia  • Chronic respiratory failure with hypoxia  • Respiratory Distress  • Hypoxia  • Other explanation of clinical findings  • Findings of no clinical significance  • Unable to determine    The medical record reflects the following:   Clinical Findings 5/09 H&P: \"Acute respiratory failure with hypoxia\" is documented.  5/9 Respiratory Charting Form: Pulse Oximetry 94; O2 - 4L per nasal cannula  5/10 Respiratory Charting Form: Pulse Oximetry 92; O2 - 2L per nasal cannula  5/10 & 5/11 Progress Notes: \"Chronic respiratory failure with hypoxia\" is documented\"   Treatment O2: 4 L per nasal cannula; respiratory therapy protocol - nebulizer; Unasyn/Azithromycin    Risk Factors Age; Pneumonia likely due to aspiration; Dysphagia; CKD II; Anemia   Location within medical record  History and Physical, Progress Notes and Respiratory Charting Form     Thank you,   Kori Stroud RN  Clinical   [phone]          "

## 2017-05-12 NOTE — CARE PLAN
Problem: Infection  Goal: Will remain free from infection  Outcome: PROGRESSING AS EXPECTED  No new signs or symptoms of infection at this time.    Problem: Pain Management  Goal: Pain level will decrease to patient’s comfort goal  Outcome: PROGRESSING AS EXPECTED  Patient denies pain at this time.

## 2017-05-12 NOTE — DISCHARGE PLANNING
Per [facility] request, spoke with Gregg at [facility], transport has been rescheduled for 1100 with [facility] for 05-13-17.

## 2017-05-12 NOTE — DISCHARGE PLANNING
Per [facility] request, transport arranged for patient to transfer to her home in [place] at 0900 on 05-13-17 via [facility].

## 2017-05-13 PROBLEM — N39.0 UTI (URINARY TRACT INFECTION): Status: ACTIVE | Noted: 2017-01-01

## 2017-05-13 NOTE — DISCHARGE INSTRUCTIONS
Discharge Instructions    Discharged to group home by ambulance with escort. Discharged via ambulance, hospital escort: Refused.  Special equipment needed: Not Applicable    Be sure to schedule a follow-up appointment with your primary care doctor or any specialists as instructed.     Discharge Plan:   Influenza Vaccine Indication: Indicated: Not available from distributor/    I understand that a diet low in cholesterol, fat, and sodium is recommended for good health. Unless I have been given specific instructions below for another diet, I accept this instruction as my diet prescription.   Other diet: Nectar thick liquids    Special Instructions: None    · Is patient discharged on Warfarin / Coumadin?   No     · Is patient Post Blood Transfusion?  No    Pneumonia, Adult  Pneumonia is an infection of the lungs.   CAUSES  Pneumonia may be caused by bacteria or a virus. Usually, the infection is caused by breathing in droplets from an infected person's cough or sneeze.   SYMPTOMS   Symptoms of pneumonia include:  · Cough.  · Fever.  · Chest pain.  · Rapid breathing.  · Shortness of breath.  · Shaking chills.  · Mucus production.  DIAGNOSIS   If you have the common symptoms of pneumonia, often your health care provider will confirm the diagnosis with a chest X-ray. The X-ray will show an abnormality in the lung if you have pneumonia. Other tests may be done on your blood, urine, or mucus (sputum) to find the specific cause of your pneumonia. A blood gas test or pulse oximetry test may be needed to check how well your lungs are working.  TREATMENT   Your treatment will depend on whether your pneumonia is caused by bacteria or a virus.   · Bacterial pneumonia is treated with antibiotic medicine.  · Pneumonia that is caused by the influenza virus may be treated with an antiviral medicine.  · Pneumonia that is caused by a virus other than influenza will not respond to antibiotic medicine. This type of pneumonia  will have to run its course.   HOME CARE INSTRUCTIONS   · Cough suppressants may be used if you are losing too much rest from coughing at night. However, you should try to avoid taking cough suppresants. This is because coughing helps to remove mucus from your lungs.  · Sleep in a semi-upright position at night. Try sleeping in a reclining chair, or place a few pillows under your head.  · Try using a cold steam vaporizer or humidifier in your home or bedroom. This may help loosen your mucus.  · If you were prescribed an antibiotic medicine, finish all of it even if you start to feel better.  · If you were prescribed an expectorant, take it as directed by your health care provider. This medicine loosens the mucus so you can cough it up.  · Take medicines only as directed by your health care provider.  · Do not smoke. If you are a smoker and continue to smoke, your cough may last several weeks after your pneumonia has cleared.  · Get rest when you feel tired, or as needed.  PREVENTION  A pneumococcal shot (vaccine) is available to prevent a common bacterial cause of pneumonia. This is usually suggested for:  · People over 65 years old.  · People on chemotherapy.  · People with chronic lung problems, such as bronchitis or emphysema.  · People with immune system problems.  If you are over 65 years old or have a high risk condition, you may receive the pneumococcal vaccine if you have not received it before. In some countries, a routine influenza vaccine is also recommended. This vaccine can help prevent some cases of pneumonia. You may be offered the influenza vaccine as part of your care.  If you are a smoker, it is time to quit in order to prevent pneumonia in the future. You may receive instructions on how to stop smoking. Your health care provider can provide medicines and counseling to help you quit.  SEEK MEDICAL CARE IF:  · You have a fever.  · You cannot control your cough with suppressants at night, and you  keep losing sleep.  SEEK IMMEDIATE MEDICAL CARE IF:   · You have worsening shortness of breath.  · You have increased chest pain.  · Your sickness becomes worse, especially if you are an older adult or have a weakened immune system.  · You cough up blood.  · You have pain that is getting worse or is not controlled with medicines.  · Your symptoms are getting worse rather than better.     This information is not intended to replace advice given to you by your health care provider. Make sure you discuss any questions you have with your health care provider.     Document Released: 12/18/2006 Document Revised: 01/08/2016 Document Reviewed: 04/13/2016  [employer] Interactive Patient Education ©2016 [employer]

## 2017-05-13 NOTE — CARE PLAN
Problem: Safety  Goal: Will remain free from falls  Outcome: PROGRESSING AS EXPECTED  A&Ox1. Max assist. Safety precautions in place. Bed alarm on, bed placed in low position, call light within reach. Hourly rounding done.    Problem: Discharge Barriers/Planning  Goal: Patient’s continuum of care needs will be met  Outcome: PROGRESSING AS EXPECTED  Will d/c tomorrow to [facility] with hospice.

## 2017-05-13 NOTE — CARE PLAN
Problem: Mobility  Goal: Risk for activity intolerance will decrease  Outcome: PROGRESSING AS EXPECTED  Patient repositioned, skin intact. Does not ambulate.     Problem: Urinary Elimination:  Goal: Ability to reestablish a normal urinary elimination pattern will improve  Outcome: PROGRESSING AS EXPECTED  Patient is incontinent, adequate output- which is her baseline.

## 2017-05-13 NOTE — PROGRESS NOTES
Patient alert to self, eager to go home.   Tolerating IV antibiotics. Taking PO meds crushed in applesauce.   Turned every 2 hours, sacrum/coccyx intact.   Incontinent of urine.   An[facility]ate [facility] with hospice care tomorrow @ 1100 by MICAELA.

## 2017-05-13 NOTE — PROGRESS NOTES
Received report from day shift RN. Discussed POC with pt., verbalized understanding, assumed care @1915. A&Ox1. Pt oriented to self only. On 2 liters of O2 via nasal cannula. Tolerating well. Upper lung sounds clear, lower lung sounds diminished. On full liquid, NT diet with 1:1 supervision. Pt medications crushed in pudding. Tolerated well. Pt skin intact. Incontinent of bowel and urine. Unable to ambulate. Max assist. [facility]. Denies pain. Bed alarm on. Safety precautions in place; treaded socks on, call light within reach, personal belongings within reach, upper bed rails up.

## 2017-05-13 NOTE — PROGRESS NOTES
Patient discharged to [facility] with [facility] following.   Transported by [facility], patricia Deutsch updated on discharge plan.   Prescriptions for antibiotics sent with [facility].

## 2017-05-13 NOTE — DISCHARGE PLANNING
LINCOLN notified [facility] that pt is currently being dc'd. LINCOLN left call back number for any questions.

## 2017-07-05 PROBLEM — M79.641 PAIN OF RIGHT HAND: Status: ACTIVE | Noted: 2017-01-01

## 2017-07-05 NOTE — ASSESSMENT & PLAN NOTE
Caregiver noticed that the left top surface of the hand was swollen and puffy for a day or 2. There is a concern that there may be something metabolically wrong. In more history obtaining it is possible patient hit her hand, slept on it wrong was bitten by an insect and currently it is resolving.

## 2017-07-05 NOTE — MR AVS SNAPSHOT
"Alison Atkinson   2017 11:40 AM   Office Visit   MRN: [id number]    Department:  [facility]   Dept Phone:  [phone]    Description:  Female : 1935   Provider:  ALEKSANDRA Erickson           Reason for Visit     Diarrhea left hand issue  / 2 wks diarrhea      Allergies as of 2017     Allergen Noted Reactions    Other Drug 2013       ALL NARCOTICS       You were diagnosed with     Diarrhea, unspecified type   [6269685]       Pain of right hand   [147851]         Vital Signs     Blood Pressure Pulse Temperature Respirations Height Weight    126/68 mmHg 92 36.6 °C (97.9 °F) 12 1.499 m (4' 11\") 42.638 kg (94 lb)    Body Mass Index Oxygen Saturation Smoking Status             18.98 kg/m2 96% Former Smoker         Basic Information     Date Of Birth Sex Race Ethnicity Preferred Language    1935 Female Unknown Unknown English      Your appointments     2017 10:20 AM   Established Patient with Berry Nelson M.D.   [address] [address] [phone]           You will be receiving a confirmation call a few days before your appointment from our automated call confirmation system.              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   2016 - Present    Diarrhea R19.7   2017 - Present    Weight loss R63.4   2017 - Present    Mild pulmonary hypertension (CMS-[facility]) I27.2   2/10/2017 - Present    Anemia due to chronic blood loss D50.0   2/10/2017 - Present   " Chronic respiratory failure with hypoxia (CMS-[facility]) J96.11   2/21/2017 - Present    Slow transit constipation K59.01   2/21/2017 - Present    Pneumonia J18.9   5/9/2017 - Present    CKD (chronic kidney disease), 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   5/10/2017 - Present    Dysphagia R13.10   5/10/2017 - Present    UTI (urinary tract infection) N39.0   5/13/2017 - Present    Pain of right hand M79.641   7/5/2017 - Present      Health Maintenance        Date Due Completion Dates    IMM DTaP/Tdap/Td Vaccine (1 - Tdap) 12/20/1956 ---    IMM ZOSTER VACCINE 12/20/1997 ---    IMM PNEUMOCOCCAL 65+ (ADULT) LOW/MEDIUM RISK SERIES (2 of 2 - PPSV23) 1/25/2017 1/25/2016    IMM INFLUENZA (1) 9/1/2017 11/25/2015, 11/26/2014    BONE DENSITY 6/26/2018 6/26/2013            Current Immunizations     13-VALENT PCV PREVNAR 1/25/2016  2:45 PM    Influenza TIV (IM) 11/25/2015, 11/26/2014    Tuberculin Skin Test 12/6/2016, 12/22/2015, 12/15/2015, 1/22/2015, 10/14/2014, 2/27/2014 10:45 AM, 2/20/2014  1:30 PM      Below and/or attached are the medications your provider expects you to take. Review all of your home medications and newly ordered medications with your provider and/or pharmacist. Follow medication instructions as directed by your provider and/or pharmacist. Please keep your medication list with you and share with your provider. Update the information when medications are discontinued, doses are changed, or new medications (including over-the-counter products) are added; and carry medication information at all times in the event of emergency situations     Allergies:  OTHER DRUG - (reactions not documented)               Medications  Valid as of: July 05, 2017 - 12:19 PM    Generic Name Brand Name Tablet Size Instructions for use    Docusate Sodium (Cap) COLACE 100 MG Take 1 Cap by mouth 2 times a day.        Donepezil HCl (Tab) ARICEPT 10 MG         Haloperidol Lactate (Conc) HALDOL 2 MG/ML Take 1 mg by mouth every bedtime.        Haloperidol Lactate  (Conc) HALDOL 2 MG/ML Take 0.25 mg by mouth 3 times a day as needed.        Loperamide HCl (Cap) IMODIUM 2 MG Take 1 Cap by mouth 4 times a day as needed for Diarrhea.        LORazepam (Tab) ATIVAN 0.5 MG Take 0.5 mg by mouth 3 times a day as needed for Anxiety.        Mirtazapine (Tab) REMERON 15 MG Take 1 Tab by mouth every bedtime.        TraZODone HCl (Tab) DESYREL 50 MG Take 50 mg by mouth every evening.        .                 Medicines prescribed today were sent to:     [facility] [place], [place] - [address] [facility]    [address] [place] [id number]    Phone: [phone] Fax: [phone]    Open 24 Hours?: No      Medication refill instructions:       If your prescription bottle indicates you have medication refills left, it is not necessary to call your provider’s office. Please contact your pharmacy and they will refill your medication.    If your prescription bottle indicates you do not have any refills left, you may request refills at any time through one of the following ways: The online [employer] system (except Urgent Care), by calling your provider’s office, or by asking your pharmacy to contact your provider’s office with a refill request. Medication refills are processed only during regular business hours and may not be available until the next business day. Your provider may request additional information or to have a follow-up visit with you prior to refilling your medication.   *Please Note: Medication refills are assigned a new Rx number when refilled electronically. Your pharmacy may indicate that no refills were authorized even though a new prescription for the same medication is available at the pharmacy. Please request the medicine by name with the pharmacy before contacting your provider for a refill.        Your To Do List     Future Labs/Procedures Complete By Expires    CDIFF BY PCR  As directed 7/6/2017    CULTURE STOOL  As directed 7/5/2018         [employer] Status: Patient Declined

## 2017-07-05 NOTE — ASSESSMENT & PLAN NOTE
This is a 81 year old female with 2 weeks of diarrhea. Patient is in a wheelchair accompanied by a caregiver from the [facility]. Patient is in a residential facility. She is also in hospice. Patient suffers from Parkinson's, hypertension chronic respiratory failure and kidney disease. Patient is currently taking trazodone, Remeron, Ativan as needed Haldol and donepezil 10 mg.  Looking through the chart and has had evidence of chronic diarrhea. Her current nutritional intake is 3 ensure or cans and then whatever food she happens to eat.  Staff is worried that she may have C. difficile or an infectious diarrhea.  Patient weighs 94 pounds today down 3 pounds from last visit.  Diarrhea is not noted as odorous, black or bloody. It's intermittent watery.

## 2017-07-05 NOTE — PROGRESS NOTES
Chief Complaint   Patient presents with   • Diarrhea     left hand issue  / 2 wks diarrhea       HISTORY OF PRESENT ILLNESS: Patient is a @ age 81 here  today to discuss:    Interval history:     Hospitalizations no    Injuries no    Illness no        Diarrhea  This is a 81 year old female with 2 weeks of diarrhea. Patient is in a wheelchair accompanied by a caregiver from the [facility]. Patient is in a residential facility. She is also in hospice. Patient suffers from Parkinson's, hypertension chronic respiratory failure and kidney disease. Patient is currently taking trazodone, Remeron, Ativan as needed Haldol and donepezil 10 mg.  Looking through the chart and has had evidence of chronic diarrhea. Her current nutritional intake is 3 ensure or cans and then whatever food she happens to eat.  Staff is worried that she may have C. difficile or an infectious diarrhea.  Patient weighs 94 pounds today down 3 pounds from last visit.  Diarrhea is not noted as odorous, black or bloody. It's intermittent watery.        Pain of right hand  Caregiver noticed that the left top surface of the hand was swollen and puffy for a day or 2. There is a concern that there may be something metabolically wrong. In more history obtaining it is possible patient hit her hand, slept on it wrong was bitten by an insect and currently it is resolving.        Allergies:Other drug    Current Outpatient Prescriptions Ordered in [facility]   Medication Sig Dispense Refill   • trazodone (DESYREL) 50 MG Tab Take 50 mg by mouth every evening.     • haloperidol (HALDOL) 2 MG/ML Conc Take 1 mg by mouth every bedtime.     • haloperidol (HALDOL) 2 MG/ML Conc Take 0.25 mg by mouth 3 times a day as needed.     • loperamide (IMODIUM) 2 MG Cap Take 1 Cap by mouth 4 times a day as needed for Diarrhea. 30 Cap 0   • donepezil (ARICEPT) 10 MG tablet      • lorazepam (ATIVAN) 0.5 MG Tab Take 0.5 mg by mouth 3 times a day as needed for Anxiety.     • docusate sodium  "(COLACE) 100 MG Cap Take 1 Cap by mouth 2 times a day. 60 Cap 3   • mirtazapine (REMERON) 15 MG Tab Take 1 Tab by mouth every bedtime. 30 Tab 11     No current Epic-ordered facility-administered medications on file.       Past Medical History   Diagnosis Date   • CATARACT    • Hypertension    • OSTEOPOROSIS        Social History   Substance Use Topics   • Smoking status: Former Smoker     Types: Cigarettes     Quit date: 2006   • Smokeless tobacco: Never Used   • Alcohol Use: No       Family Status   Relation Status Death Age   • Mother     • Father     • Brother Alive    • Brother     • Brother       Family History   Problem Relation Age of Onset   • Other Mother    • Lung Disease Father    • Lung Disease Brother    • Lung Disease Brother        ROS: As documented in my HPI      Exam:  Blood pressure 126/68, pulse 92, temperature 36.6 °C (97.9 °F), resp. rate 12, height 1.499 m (4' 11\"), weight 42.638 kg (94 lb), SpO2 96 %.  General:  Well nourished, well developed female in NAD  Head: Nontender scalp. No lesions  Skin: No tenting producing spit.  Pulmonary:  Normal effort. No rales, ronchi, or wheezing.  Cardiovascular: Regular rate and rhythm without murmur.   Abdomen: Soft nontender, normal bowel sounds  Extremities: no clubbing, cyanosis, or edema.  Psych: Alert but not oriented to self or place  Neurological:  dementia, and wheelchair    Please note that this dictation was created using voice recognition software. I have made every reasonable attempt to correct obvious errors, but I expect that there are errors of grammar and possibly content that I did not discover before finalizing the note.    Assessment/Plan:  1. Diarrhea, unspecified type - not controlled   We'll try Imodium 2 mg one every 6 hours   Indigo diet   Donepezil to be 5 mg instead of 10 the R allowed to split the 10 mg to 5 mg.   Remove one insure evening meal and add more of the diet from bananas, " applesauce rice, toast. Hydration is important and discussed we'll plan to get C. difficile and stool culture.  CDIFF BY PCR    CULTURE STOOL           2. Pain of right hand   seems improved continued monitoring.         Paperwork filled out for [facility]. Will notify test results. Staff is to monitor intake and urinary output. If not drinking or having urinary output to discuss with this office or hospice.

## 2017-07-06 NOTE — TELEPHONE ENCOUNTER
Was the patient seen in the last year in this department? Yes   7/17    Does patient have an active prescription for medications requested? Yes     Received Request Via: Pharmacy

## 2017-07-10 NOTE — ED AVS SNAPSHOT
Home Care Instructions                                                                                                                Alison Atkinson   MRN: [id number]    Department:  [facility], Emergency Dept   Date of Visit:  7/10/2017            [facility], Emergency Dept    [address]    [place] [address]    Phone:  [phone]      You were seen by     Iglesia Rico M.D.      Your Diagnosis Was     C. difficile diarrhea     A04.7       These are the medications you received during your hospitalization from 07/10/2017 1952 to 07/10/2017 2358     Date/Time Order Dose Route Action    07/10/2017 2100 NS infusion 1,000 mL 1,000 mL Intravenous New Bag    07/10/2017 2341 metronidazole (FLAGYL) tablet 500 mg 500 mg Oral Given      Follow-up Information     1. Follow up with MAYTE Trevino. Schedule an appointment as soon as possible for a visit in 2 days.    Specialty:  Family Medicine    Contact information    [facility] [address] [place] [address] [phone]        Medication Information     Review all of your home medications and newly ordered medications with your primary doctor and/or pharmacist as soon as possible. Follow medication instructions as directed by your doctor and/or pharmacist.     Please keep your complete medication list with you and share with your physician. Update the information when medications are discontinued, doses are changed, or new medications (including over-the-counter products) are added; and carry medication information at all times in the event of emergency situations.               Medication List      START taking these medications        Instructions    Morning Afternoon Evening Bedtime    acetaminophen 325 MG Tabs   Commonly known as:  TYLENOL        Take 2 Tabs by mouth every 6 hours as needed.   Dose:  650 mg                        metronidazole 500 MG Tabs   Last time this was given:  500 mg on  7/10/2017 11:41 PM   Commonly known as:  FLAGYL        Take 1 Tab by mouth 3 times a day for 14 days.   Dose:  500 mg                        ondansetron 4 MG Tbdp   Commonly known as:  ZOFRAN ODT        Take 1 Tab by mouth every 8 hours as needed for Nausea/Vomiting.   Dose:  4 mg                          ASK your doctor about these medications        Instructions    Morning Afternoon Evening Bedtime    ATIVAN 0.5 MG Tabs   Generic drug:  lorazepam        Take 0.5 mg by mouth 3 times a day as needed for Anxiety.   Dose:  0.5 mg                        docusate sodium 100 MG Caps   Commonly known as:  COLACE        Take 1 Cap by mouth 2 times a day.   Dose:  100 mg                        donepezil 10 MG tablet   Commonly known as:  ARICEPT        Take 0.5 Tabs by mouth every day.   Dose:  5 mg                        * haloperidol 2 MG/ML Conc   Commonly known as:  HALDOL        Take 1 mg by mouth every bedtime.   Dose:  1 mg                        * haloperidol 2 MG/ML Conc   Commonly known as:  HALDOL        Take 0.25 mg by mouth 3 times a day as needed.   Dose:  0.25 mg                        loperamide 2 MG Caps   Commonly known as:  IMODIUM        Take 1 Cap by mouth 4 times a day as needed for Diarrhea.   Dose:  2 mg                        mirtazapine 15 MG Tabs   Commonly known as:  REMERON        Take 1 Tab by mouth every bedtime.   Dose:  15 mg                        trazodone 50 MG Tabs   Commonly known as:  DESYREL        Take 50 mg by mouth every evening.   Dose:  50 mg                        * Notice:  This list has 2 medication(s) that are the same as other medications prescribed for you. Read the directions carefully, and ask your doctor or other care provider to review them with you.         Where to Get Your Medications      You can get these medications from any pharmacy     Bring a paper prescription for each of these medications    - acetaminophen 325 MG Tabs  - metronidazole 500 MG Tabs  -  ondansetron 4 MG Tbdp            Procedures and tests performed during your visit     CBC WITH DIFFERENTIAL    CDIFF BY PCR WITH TOXIN    COMP METABOLIC PANEL    DX-HIP-COMPLETE - UNILATERAL 2+ RIGHT    DX-LUMBAR SPINE-2 OR 3 VIEWS    ESTIMATED GFR    IV Saline Lock        Discharge Instructions       Return if she has a fever, abdominal pain, not drinking, or blood in stool.   Take all your antibiotics until gone.   Clostridium Difficile Infection  Clostridium difficile (C. difficile or C. diff) is a bacterium normally found in the intestinal tract or colon. C. difficile infection causes diarrhea and sometimes a severe disease called pseudomembranous colitis (C. difficile colitis). C. difficile colitis can damage the lining of the colon or cause the colon to become very large (toxic megacolon). Older adults and people with certain medical conditions have a greater risk of getting C. difficile infections.  CAUSES  The balance of bacteria in your colon can change when you are sick, especially when taking antibiotic medicine. Taking antibiotics may allow the C. difficile to grow, multiply, and make a toxin that causes C. difficile infection.   SYMPTOMS  · Diarrhea.  · Fever.  · Fatigue.  · Loss of appetite.  · Nausea.  · Abdominal swelling, pain, or tenderness.  · Dehydration.  DIAGNOSIS  Your health care provider may suspect C. difficile infection based on your symptoms and if you have taken antibiotics recently. Your health care provider may also order:  · A lab test that can detect the toxin in your stool.  · A sigmoidoscopy or colonoscopy to look at the appearance of your colon. These procedures involve passing an instrument through your rectum to look at the inside of your colon.  Your health care provider will help determine if these tests are necessary.  TREATMENT  Treatment may include:  · Taking antibiotics that keep C. difficile from growing.  · Stopping the antibiotics you were on before the C. difficile  "infection began. Only do this if instructed to do so by your health care provider.  · IV fluids and correction of electrolyte imbalance.  · Surgery to remove the infected part of the intestines. This is rare.  HOME CARE INSTRUCTIONS  · Drink enough fluids to keep your urine clear or pale yellow. Avoid milk, caffeine, and alcohol.  · Ask your health care provider for specific rehydration instructions.  · Eat small, frequent meals rather than large meals.  · Take your antibiotics as directed. Finish them even if you start to feel better.  · Do not use medicines to slow diarrhea. This could delay healing or cause problems.  · Wash your hands thoroughly after using the bathroom and before preparing food. Make sure people who live with you wash their hands often, too.  · Clean all surfaces with a product that contains chlorine bleach.  SEEK MEDICAL CARE IF:  · Your diarrhea lasts longer than expected or comes back after you finish your antibiotic medicine for the C. difficile infection.  · You have trouble staying hydrated.  · You have a fever.  SEEK IMMEDIATE MEDICAL CARE IF:  · You have increasing abdominal pain or tenderness.  · You have blood in your stools, or your stools look dark black and tarry.  · You cannot eat or drink without vomiting.     This information is not intended to replace advice given to you by your health care provider. Make sure you discuss any questions you have with your health care provider.     Document Released: 09/27/2006 Document Revised: 01/08/2016 Document Reviewed: 05/25/2012  [employer] Interactive Patient Education ©2016 Elsevier Inc.        FAQs  What is Clostridium difficile infection?   Clostridium difficile [pronounced Jyw-MAKKK-af-um dif-uh-SEEL], also known as \"C. diff\" [See-dif], is a germ that can cause diarrhea. Most cases of C. diff infection occur in patients taking antibiotics. The most common symptoms of a C. diff infection include:  · Watery diarrhea  · Fever  · Loss of " appetite  · Nausea  · Belly pain and tenderness  Who is most likely to get C. diff infection?  The elderly and people with certain medical problems have the greatest chance of getting C. diff. C. diff spores can live outside the human body for a very long time and may be found on things in the environment such as bed linens, bed rails, bathroom fixtures, and medical equipment. C. diff infection can spread from person-to-person on contaminated equipment and on the hands of doctors, nurses, other healthcare providers and visitors.  Can C. diff infection be treated?  Yes, there are antibiotics that can be used to treat C. diff. In some severe cases, a person might have to have surgery to remove the infected part of the intestines. This surgery is needed in only 1 or 2 out of every 100 persons with C. diff.  What are some of the things that hospitals are doing to prevent C. diff infections?  To prevent C. diff infections, doctors, nurses, and other healthcare providers:  · Clean their hands with soap and water or an alcohol-based hand rub before and after caring for every patient. This can prevent C. diff and other germs from being passed from one patient to another on their hands.  · Carefully clean hospital rooms and medical equipment that have been used for patients with C. diff.  · Use Contact Precautions to prevent C. diff from spreading to other patients. Contact Precautions mean:  · Whenever possible, patients with C. diff will have a single room or share a room only with someone else who also has C. diff.  · Healthcare providers will put on gloves and wear a gown over their clothing while taking care of patients with C. diff.  · Visitors may also be asked to wear a gown and gloves.  · When leaving the room, hospital providers and visitors remove their gown and gloves and clean their hands.  · Patients on Contact Precautions are asked to stay in their hospital rooms as much as possible. They should not go to  common areas, such as the gift shop or cafeteria. They can go to other areas of the hospital for treatments and tests.  · Only give patients antibiotics when it is necessary.  What can I do to help prevent C. diff infections?  · Make sure that all doctors, nurses, and other healthcare providers clean their hands with soap and water or an alcohol-based hand rub before and after caring for you.  · If you do not see your providers clean their hands, please ask them to do so.  · Only take antibiotics as prescribed by your doctor.  · Be sure to clean your own hands often, especially after using the bathroom and before eating.  Can my friends and family get C. diff when they visit me?  C. diff infection usually does not occur in persons who are not taking antibiotics. Visitors are not likely to get C. diff. Still, to make it safer for visitors, they should:  · Clean their hands before they enter your room and as they leave your room  · Ask the nurse if they need to wear protective gowns and gloves when they visit you.  What do I need to do when I go home from the hospital?  Once you are back at home, you can return to your normal routine. Often, the diarrhea will be better or completely gone before you go home. This makes giving C. diff to other people much less likely. There are a few things you should do, however, to lower the chances of developing C. diff infection again or of spreading it to others.  · If you are given a prescription to treat C. diff, take the medicine exactly as prescribed by your doctor and pharmacist. Do not take half-doses or stop before you run out.  · Wash your hands often, especially after going to the bathroom and before preparing food.  · People who live with you should wash their hands often as well.  · If you develop more diarrhea after you get home, tell your doctor immediately.  · Your doctor may give you additional instructions.  If you have questions, please ask your doctor or  nurse.  Developed and co-sponsored by The Society for Healthcare Epidemiology of [place] (SHEA); Infectious Diseases Society of [place] (IDSA); American Hospital Association; Association for Professionals in Infection Control and Epidemiology (APIC); Centers for Disease Control and Prevention (CDC); and The Joint Commission.     This information is not intended to replace advice given to you by your health care provider. Make sure you discuss any questions you have with your health care provider.     Document Released: 12/23/2014 Document Revised: 05/03/2016 Document Reviewed: 03/02/2016  [employer] Interactive Patient Education ©2016 Elsevier Inc.    Back Pain, Adult  Back pain is very common. The pain often gets better over time. The cause of back pain is usually not dangerous. Most people can learn to manage their back pain on their own.   HOME CARE   Watch your back pain for any changes. The following actions may help to lessen any pain you are feeling:  · Stay active. Start with short walks on flat ground if you can. Try to walk farther each day.  · Exercise regularly as told by your doctor. Exercise helps your back heal faster. It also helps avoid future injury by keeping your muscles strong and flexible.  · Do not sit, drive, or  one place for more than 30 minutes.  · Do not stay in bed. Resting more than 1-2 days can slow down your recovery.  · Be careful when you bend or lift an object. Use good form when lifting:  ¨ Bend at your knees.  ¨ Keep the object close to your body.  ¨ Do not twist.  · Sleep on a firm mattress. Lie on your side, and bend your knees. If you lie on your back, put a pillow under your knees.  · Take medicines only as told by your doctor.  · Put ice on the injured area.  ¨ Put ice in a plastic bag.  ¨ Place a towel between your skin and the bag.  ¨ Leave the ice on for 20 minutes, 2-3 times a day for the first 2-3 days. After that, you can switch between ice and heat  packs.  · Avoid feeling anxious or stressed. Find good ways to deal with stress, such as exercise.  · Maintain a healthy weight. Extra weight puts stress on your back.  GET HELP IF:   · You have pain that does not go away with rest or medicine.  · You have worsening pain that goes down into your legs or buttocks.  · You have pain that does not get better in one week.  · You have pain at night.  · You lose weight.  · You have a fever or chills.  GET HELP RIGHT AWAY IF:   · You cannot control when you poop (bowel movement) or pee (urinate).  · Your arms or legs feel weak.  · Your arms or legs lose feeling (numbness).  · You feel sick to your stomach (nauseous) or throw up (vomit).  · You have belly (abdominal) pain.  · You feel like you may pass out (faint).     This information is not intended to replace advice given to you by your health care provider. Make sure you discuss any questions you have with your health care provider.     Document Released: 06/05/2009 Document Revised: 01/08/2016 Document Reviewed: 04/21/2015  [employer] Interactive Patient Education ©2016 [employer].            Patient Information     Patient Information    Following emergency treatment: all patient requiring follow-up care must return either to a private physician or a clinic if your condition worsens before you are able to obtain further medical attention, please return to the emergency room.     Billing Information    At [facility], we work to make the billing process streamlined for our patients.  Our Representatives are here to answer any questions you may have regarding your hospital bill.  If you have insurance coverage and have supplied your insurance information to us, we will submit a claim to your insurer on your behalf.  Should you have any questions regarding your bill, we can be reached online or by phone as follows:  Online: You are able pay your bills online or live chat with our representatives about any billing  questions you may have. We are here to help Monday - Friday from 8:00am to 7:30pm and 9:00am - 12:00pm on Saturdays.  Please visit [email or web address]  for more information.   Phone:  [phone] or [phone]    Please note that your emergency physician, surgeon, pathologist, radiologist, anesthesiologist, and other specialists are not employed by [facility] and will therefore bill separately for their services.  Please contact them directly for any questions concerning their bills at the numbers below:     Emergency Physician Services:  [phone]  [facility]:  [phone]  [facility]:  [phone]  [facility]:  [phone]    1. Your final bill may vary from the amount quoted upon discharge if all procedures are not complete at that time, or if your doctor has additional procedures of which we are not aware. You will receive an additional bill if you return to the Emergency Department at Counts include 234 beds at the [facility] for suture removal regardless of the facility of which the sutures were placed.     2. Please arrange for settlement of this account at the emergency registration.    3. All self-pay accounts are due in full at the time of treatment.  If you are unable to meet this obligation then payment is expected within 4-5 days.     4. If you have had radiology studies (CT, X-ray, Ultrasound, MRI), you have received a preliminary result during your emergency department visit. Please contact the radiology department [phone] to receive a copy of your final result. Please discuss the Final result with your primary physician or with the follow up physician provided.     Crisis Hotline:  National Crisis Hotline:  6-385-KPBRGQW or 1-997.987.8545  [place] Crisis Hotline:    1-335.220.3426 or 588-670-4151         ED Discharge Follow Up Questions    1. In order to provide you with very good care, we would like to follow up with a phone call  in the next few days.  May we have your permission to contact you?     YES /  NO    2. What is the best phone number to call you? (       )_____-__________    3. What is the best time to call you?      Morning  /  Afternoon  /  Evening                   Patient Signature:  ____________________________________________________________    Date:  ____________________________________________________________      Your appointments     Jul 20, 2017 10:20 AM   Established Patient with Berry Nelson M.D.   [facility] [place]) [phone]  [address] [phone]           You will be receiving a confirmation call a few days before your appointment from our automated call confirmation system.

## 2017-07-10 NOTE — ED AVS SNAPSHOT
7/10/2017    Alison Atkinson  [address] [address]    Dear [place]:    [facility] wants to ensure your discharge home is safe and you or your loved ones have had all of your questions answered regarding your care after you leave the hospital.    Below is a list of resources and contact information should you have any questions regarding your hospital stay, follow-up instructions, or active medical symptoms.    Questions or Concerns Regarding… Contact   Medical Questions Related to Your Discharge  (7 days a week, 8am-5pm) Contact a Nurse Care Coordinator   [phone]   Medical Questions Not Related to Your Discharge  (24 hours a day / 7 days a week)  Contact the Nurse Health Line   [phone]    Medications or Discharge Instructions Refer to your discharge packet   or contact your [facility] Primary Care Provider   [phone]   Follow-up Appointment(s) Schedule your appointment via [employer]ia   or contact Scheduling [phone]   Billing Review your statement via [employer]  or contact Billing [phone]   Medical Records Review your records via [employer]   or contact Medical Records [phone]     You may receive a telephone call within two days of discharge. This call is to make certain you understand your discharge instructions and have the opportunity to have any questions answered. You can also easily access your medical information, test results and upcoming appointments via the [employer]ia free online health management tool. You can learn more and sign up at [email or web address]. For assistance setting up your Photo[employer]ia account, please call [phone].    Once again, we want to ensure your discharge home is safe and that you have a clear understanding of any next steps in your care. If you have any questions or concerns, please do not hesitate to contact us, we are here for you. Thank you for choosing [facility] for your healthcare needs.    Sincerely,    Your [facility] Healthcare Team

## 2017-07-10 NOTE — ED AVS SNAPSHOT
Jiankongbao Access Code: Activation code not generated  Current Jiankongbao Status: Patient Declined    "[employer][email or web address]  A secure, online tool to manage your health information     [employer]’s Jiankongbao® is a secure, online tool that connects you to your personalized health information from the privacy of your home -- day or night - making it very easy for you to manage your healthcare. Once the activation process is completed, you can even access your medical information using the Jiankongbao lito, which is available for free in the Apple Lito store or Google Play store.     Jiankongbao provides the following levels of access (as shown below):   My Chart Features   [facility] Primary Care Doctor [facility]  Specialists [facility]  Urgent  Care Non-[facility]  Primary Care  Doctor   Email your healthcare team securely and privately 24/7 X X X X   Manage appointments: schedule your next appointment; view details of past/upcoming appointments X      Request prescription refills. X      View recent personal medical records, including lab and immunizations X X X X   View health record, including health history, allergies, medications X X X X   Read reports about your outpatient visits, procedures, consult and ER notes X X X X   See your discharge summary, which is a recap of your hospital and/or ER visit that includes your diagnosis, lab results, and care plan. X X       How to register for Jiankongbao:  1. Go to  [email or web address].  2. Click on the Sign Up Now box, which takes you to the New Member Sign Up page. You will need to provide the following information:  a. Enter your Jiankongbao Access Code exactly as it appears at the top of this page. (You will not need to use this code after you’ve completed the sign-up process. If you do not sign up before the expiration date, you must request a new code.)   b. Enter your date of birth.   c. Enter your home email address.   d. Click Submit, and follow the next screen’s instructions.  3. Create a Jiankongbao ID.  This will be your [employer] login ID and cannot be changed, so think of one that is secure and easy to remember.  4. Create a [employer] password. You can change your password at any time.  5. Enter your Password Reset Question and Answer. This can be used at a later time if you forget your password.   6. Enter your e-mail address. This allows you to receive e-mail notifications when new information is available in [employer].  7. Click Sign Up. You can now view your health information.    For assistance activating your [employer] account, call [phone]

## 2017-07-11 NOTE — DISCHARGE INSTRUCTIONS
Return if she has a fever, abdominal pain, not drinking, or blood in stool.   Take all your antibiotics until gone.   Clostridium Difficile Infection  Clostridium difficile (C. difficile or C. diff) is a bacterium normally found in the intestinal tract or colon. C. difficile infection causes diarrhea and sometimes a severe disease called pseudomembranous colitis (C. difficile colitis). C. difficile colitis can damage the lining of the colon or cause the colon to become very large (toxic megacolon). Older adults and people with certain medical conditions have a greater risk of getting C. difficile infections.  CAUSES  The balance of bacteria in your colon can change when you are sick, especially when taking antibiotic medicine. Taking antibiotics may allow the C. difficile to grow, multiply, and make a toxin that causes C. difficile infection.   SYMPTOMS  · Diarrhea.  · Fever.  · Fatigue.  · Loss of appetite.  · Nausea.  · Abdominal swelling, pain, or tenderness.  · Dehydration.  DIAGNOSIS  Your health care provider may suspect C. difficile infection based on your symptoms and if you have taken antibiotics recently. Your health care provider may also order:  · A lab test that can detect the toxin in your stool.  · A sigmoidoscopy or colonoscopy to look at the appearance of your colon. These procedures involve passing an instrument through your rectum to look at the inside of your colon.  Your health care provider will help determine if these tests are necessary.  TREATMENT  Treatment may include:  · Taking antibiotics that keep C. difficile from growing.  · Stopping the antibiotics you were on before the C. difficile infection began. Only do this if instructed to do so by your health care provider.  · IV fluids and correction of electrolyte imbalance.  · Surgery to remove the infected part of the intestines. This is rare.  HOME CARE INSTRUCTIONS  · Drink enough fluids to keep your urine clear or pale yellow. Avoid  "milk, caffeine, and alcohol.  · Ask your health care provider for specific rehydration instructions.  · Eat small, frequent meals rather than large meals.  · Take your antibiotics as directed. Finish them even if you start to feel better.  · Do not use medicines to slow diarrhea. This could delay healing or cause problems.  · Wash your hands thoroughly after using the bathroom and before preparing food. Make sure people who live with you wash their hands often, too.  · Clean all surfaces with a product that contains chlorine bleach.  SEEK MEDICAL CARE IF:  · Your diarrhea lasts longer than expected or comes back after you finish your antibiotic medicine for the C. difficile infection.  · You have trouble staying hydrated.  · You have a fever.  SEEK IMMEDIATE MEDICAL CARE IF:  · You have increasing abdominal pain or tenderness.  · You have blood in your stools, or your stools look dark black and tarry.  · You cannot eat or drink without vomiting.     This information is not intended to replace advice given to you by your health care provider. Make sure you discuss any questions you have with your health care provider.     Document Released: 09/27/2006 Document Revised: 01/08/2016 Document Reviewed: 05/25/2012  [employer] Interactive Patient Education ©2016 [employer] Inc.        FAQs  What is Clostridium difficile infection?   Clostridium difficile [pronounced Qfw-GGVAW-yd-um dif-uh-SEEL], also known as \"C. diff\" [See-dif], is a germ that can cause diarrhea. Most cases of C. diff infection occur in patients taking antibiotics. The most common symptoms of a C. diff infection include:  · Watery diarrhea  · Fever  · Loss of appetite  · Nausea  · Belly pain and tenderness  Who is most likely to get C. diff infection?  The elderly and people with certain medical problems have the greatest chance of getting C. diff. C. diff spores can live outside the human body for a very long time and may be found on things in the " environment such as bed linens, bed rails, bathroom fixtures, and medical equipment. C. diff infection can spread from person-to-person on contaminated equipment and on the hands of doctors, nurses, other healthcare providers and visitors.  Can C. diff infection be treated?  Yes, there are antibiotics that can be used to treat C. diff. In some severe cases, a person might have to have surgery to remove the infected part of the intestines. This surgery is needed in only 1 or 2 out of every 100 persons with C. diff.  What are some of the things that hospitals are doing to prevent C. diff infections?  To prevent C. diff infections, doctors, nurses, and other healthcare providers:  · Clean their hands with soap and water or an alcohol-based hand rub before and after caring for every patient. This can prevent C. diff and other germs from being passed from one patient to another on their hands.  · Carefully clean hospital rooms and medical equipment that have been used for patients with C. diff.  · Use Contact Precautions to prevent C. diff from spreading to other patients. Contact Precautions mean:  · Whenever possible, patients with C. diff will have a single room or share a room only with someone else who also has C. diff.  · Healthcare providers will put on gloves and wear a gown over their clothing while taking care of patients with C. diff.  · Visitors may also be asked to wear a gown and gloves.  · When leaving the room, hospital providers and visitors remove their gown and gloves and clean their hands.  · Patients on Contact Precautions are asked to stay in their hospital rooms as much as possible. They should not go to common areas, such as the gift shop or cafeteria. They can go to other areas of the hospital for treatments and tests.  · Only give patients antibiotics when it is necessary.  What can I do to help prevent C. diff infections?  · Make sure that all doctors, nurses, and other healthcare providers  clean their hands with soap and water or an alcohol-based hand rub before and after caring for you.  · If you do not see your providers clean their hands, please ask them to do so.  · Only take antibiotics as prescribed by your doctor.  · Be sure to clean your own hands often, especially after using the bathroom and before eating.  Can my friends and family get C. diff when they visit me?  C. diff infection usually does not occur in persons who are not taking antibiotics. Visitors are not likely to get C. diff. Still, to make it safer for visitors, they should:  · Clean their hands before they enter your room and as they leave your room  · Ask the nurse if they need to wear protective gowns and gloves when they visit you.  What do I need to do when I go home from the hospital?  Once you are back at home, you can return to your normal routine. Often, the diarrhea will be better or completely gone before you go home. This makes giving C. diff to other people much less likely. There are a few things you should do, however, to lower the chances of developing C. diff infection again or of spreading it to others.  · If you are given a prescription to treat C. diff, take the medicine exactly as prescribed by your doctor and pharmacist. Do not take half-doses or stop before you run out.  · Wash your hands often, especially after going to the bathroom and before preparing food.  · People who live with you should wash their hands often as well.  · If you develop more diarrhea after you get home, tell your doctor immediately.  · Your doctor may give you additional instructions.  If you have questions, please ask your doctor or nurse.  Developed and co-sponsored by The Society for Healthcare Epidemiology of [place] (SHEA); Infectious Diseases Society of [place] (IDSA); American Hospital Association; Association for Professionals in Infection Control and Epidemiology (APIC); Centers for Disease Control and Prevention (CDC); and  The Joint Commission.     This information is not intended to replace advice given to you by your health care provider. Make sure you discuss any questions you have with your health care provider.     Document Released: 12/23/2014 Document Revised: 05/03/2016 Document Reviewed: 03/02/2016  [employer] Interactive Patient Education ©2016 [employer]    Back Pain, Adult  Back pain is very common. The pain often gets better over time. The cause of back pain is usually not dangerous. Most people can learn to manage their back pain on their own.   HOME CARE   Watch your back pain for any changes. The following actions may help to lessen any pain you are feeling:  · Stay active. Start with short walks on flat ground if you can. Try to walk farther each day.  · Exercise regularly as told by your doctor. Exercise helps your back heal faster. It also helps avoid future injury by keeping your muscles strong and flexible.  · Do not sit, drive, or  one place for more than 30 minutes.  · Do not stay in bed. Resting more than 1-2 days can slow down your recovery.  · Be careful when you bend or lift an object. Use good form when lifting:  ¨ Bend at your knees.  ¨ Keep the object close to your body.  ¨ Do not twist.  · Sleep on a firm mattress. Lie on your side, and bend your knees. If you lie on your back, put a pillow under your knees.  · Take medicines only as told by your doctor.  · Put ice on the injured area.  ¨ Put ice in a plastic bag.  ¨ Place a towel between your skin and the bag.  ¨ Leave the ice on for 20 minutes, 2-3 times a day for the first 2-3 days. After that, you can switch between ice and heat packs.  · Avoid feeling anxious or stressed. Find good ways to deal with stress, such as exercise.  · Maintain a healthy weight. Extra weight puts stress on your back.  GET HELP IF:   · You have pain that does not go away with rest or medicine.  · You have worsening pain that goes down into your legs or  buttocks.  · You have pain that does not get better in one week.  · You have pain at night.  · You lose weight.  · You have a fever or chills.  GET HELP RIGHT AWAY IF:   · You cannot control when you poop (bowel movement) or pee (urinate).  · Your arms or legs feel weak.  · Your arms or legs lose feeling (numbness).  · You feel sick to your stomach (nauseous) or throw up (vomit).  · You have belly (abdominal) pain.  · You feel like you may pass out (faint).     This information is not intended to replace advice given to you by your health care provider. Make sure you discuss any questions you have with your health care provider.     Document Released: 06/05/2009 Document Revised: 01/08/2016 Document Reviewed: 04/21/2015  [employer] Interactive Patient Education ©2016 [employer].

## 2017-07-11 NOTE — ED NOTES
Pt bib EMS from assisted living facility where she resides:  Chief Complaint   Patient presents with   • GLF   • Low Back Pain   • Diarrhea     x3 days.      Patient is DNR.  Reportedly patient has hx of dementia and parkinsons.  Ambulates with fww and wheelchair.    Diarrhea x3 days, 1 stool sample sent out from facility - 3 residents in facility have c-diff.      PIV placed, blood sent to lab.  Patient changed into gown, chart up for ERP.

## 2017-07-11 NOTE — DISCHARGE PLANNING
Medical Social Work    Referral: Transportation Assistance    Intervention: MSW received a call from bedside RN who states that pt is ready to return to [facility] ([address] [place] [address], [place], [place]) but will need [facility] transport due to dementia and parkinsons.  Pt is also on oxygen and is + for c-diff.  MSW faxed PCS and facesheet to [facility] and made follow up phone call to Jessi to arrange transport for [facility].  Bedside RN aware of transport time.    Plan: Pt to D/C back to Assisted Living via [facility].

## 2017-07-11 NOTE — ED PROVIDER NOTES
ED Provider Note    Scribed for Iglesia Rico M.D. by Maegan Eason. 7/10/2017, 8:06 PM.    Primary care provider: MAYTE Trevino  Means of arrival: Ambulance  History obtained from: Patient  History limited by: None    CHIEF COMPLAINT  Chief Complaint   Patient presents with   • GLF   • Low Back Pain   • Diarrhea     x3 days.        HPI  Alison Atkinson is a 81 y.o. female with a history of dementia and parkinson's who presents to the Emergency Department s/p GLF and now complaining of lower back pain and diarrhea onset 3 days per nursing note. Nursing note states patient is here with a complaint for lower back pain but patient denies any pain on exam. Patient confirms ambulating with a front wheel walker and occasionally a wheelchair. Denies any pain currently including abdominal pain, back pain, arm pain, leg pain.     REVIEW OF SYSTEMS  Review of Systems   Gastrointestinal: Positive for diarrhea. Negative for abdominal pain.   Musculoskeletal: Positive for falls. Negative for back pain.        Negative for arm and leg pain   All other systems reviewed and are negative.      PAST MEDICAL HISTORY   has a past medical history of CATARACT; Hypertension; and OSTEOPOROSIS.   Dementia and Parkinson's Disease.    SURGICAL HISTORY   has past surgical history that includes eye surgery.    SOCIAL HISTORY  Social History   Substance Use Topics   • Smoking status: Former Smoker     Types: Cigarettes     Quit date: 01/01/2006   • Smokeless tobacco: Never Used   • Alcohol Use: No      History   Drug Use No       FAMILY HISTORY  Family History   Problem Relation Age of Onset   • Other Mother    • Lung Disease Father    • Lung Disease Brother    • Lung Disease Brother        CURRENT MEDICATIONS  Home Medications     Reviewed by Sanjuana Ritchie R.N. (Registered Nurse) on 07/10/17 at 2000  Med List Status: Partial    Medication Last Dose Status    docusate sodium (COLACE) 100 MG Cap 5/8/2017 Active     "donepezil (ARICEPT) 10 MG tablet  Active    haloperidol (HALDOL) 2 MG/ML Conc  Active    haloperidol (HALDOL) 2 MG/ML Conc  Active    loperamide (IMODIUM) 2 MG Cap  Active    lorazepam (ATIVAN) 0.5 MG Tab unknown Active    mirtazapine (REMERON) 15 MG Tab 5/8/2017 Active    trazodone (DESYREL) 50 MG Tab  Active                ALLERGIES  Allergies   Allergen Reactions   • Other Drug      ALL NARCOTICS        PHYSICAL EXAM  VITAL SIGNS: /46 mmHg  Pulse 76  Temp(Src) 36.7 °C (98.1 °F)  Resp 16  Ht 1.499 m (4' 11\")  Wt 42.638 kg (94 lb)  BMI 18.98 kg/m2  SpO2 100%    Constitutional: Well developed, Well nourished, No acute distress.   Eyes: Conjunctiva normal, No discharge.   Neck: Supple, No stridor, No vertebral point tenderness.    Cardiovascular: Normal heart rate, Normal rhythm, No murmurs, equal pulses.   Pulmonary: Normal breath sounds, No respiratory distress, No wheezing, No rales, No rhonchi.  Abdomen:Soft, No tenderness, No masses, no rebound, no guarding.   Back: No CVA tenderness. Patient has some very mild tenderness of the lumbar spine, no step-offs or deformities  Musculoskeletal: Slight tenderness in right hip. No pain to upper extremities. No major deformities noted.  Skin: Warm, Dry, No erythema, No rash.   Neurologic: Alert & oriented x 3, Normal motor function,  No focal deficits noted.   Psychiatric: Affect normal, Judgment normal, Mood normal.     LABS  Results for orders placed or performed during the hospital encounter of 07/10/17   CBC WITH DIFFERENTIAL   Result Value Ref Range    WBC 9.2 4.8 - 10.8 K/uL    RBC 3.33 (L) 4.20 - 5.40 M/uL    Hemoglobin 10.1 (L) 12.0 - 16.0 g/dL    Hematocrit 31.4 (L) 37.0 - 47.0 %    MCV 94.3 81.4 - 97.8 fL    MCH 30.3 27.0 - 33.0 pg    MCHC 32.2 (L) 33.6 - 35.0 g/dL    RDW 45.9 35.9 - 50.0 fL    Platelet Count 486 (H) 164 - 446 K/uL    MPV 8.9 (L) 9.0 - 12.9 fL    Neutrophils-Polys 73.10 (H) 44.00 - 72.00 %    Lymphocytes 18.50 (L) 22.00 - 41.00 %    " Monocytes 4.90 0.00 - 13.40 %    Eosinophils 1.60 0.00 - 6.90 %    Basophils 0.20 0.00 - 1.80 %    Immature Granulocytes 1.70 (H) 0.00 - 0.90 %    Nucleated RBC 0.00 /100 WBC    Neutrophils (Absolute) 6.72 2.00 - 7.15 K/uL    Lymphs (Absolute) 1.70 1.00 - 4.80 K/uL    Monos (Absolute) 0.45 0.00 - 0.85 K/uL    Eos (Absolute) 0.15 0.00 - 0.51 K/uL    Baso (Absolute) 0.02 0.00 - 0.12 K/uL    Immature Granulocytes (abs) 0.16 (H) 0.00 - 0.11 K/uL    NRBC (Absolute) 0.00 K/uL   COMP METABOLIC PANEL   Result Value Ref Range    Sodium 138 135 - 145 mmol/L    Potassium 3.8 3.6 - 5.5 mmol/L    Chloride 104 96 - 112 mmol/L    Co2 26 20 - 33 mmol/L    Anion Gap 8.0 0.0 - 11.9    Glucose 164 (H) 65 - 99 mg/dL    Bun 17 8 - 22 mg/dL    Creatinine 0.77 0.50 - 1.40 mg/dL    Calcium 8.3 (L) 8.5 - 10.5 mg/dL    AST(SGOT) 12 12 - 45 U/L    ALT(SGPT) 13 2 - 50 U/L    Alkaline Phosphatase 79 30 - 99 U/L    Total Bilirubin 0.2 0.1 - 1.5 mg/dL    Albumin 2.8 (L) 3.2 - 4.9 g/dL    Total Protein 5.8 (L) 6.0 - 8.2 g/dL    Globulin 3.0 1.9 - 3.5 g/dL    A-G Ratio 0.9 g/dL   CDIFF BY PCR WITH TOXIN   Result Value Ref Range    C Diff by PCR POS-SeeToxn (A) Negative    [id number] Presumptive POSITIVE (A) Negative    C.Diff Toxin A&B Positive (A) Negative   ESTIMATED GFR   Result Value Ref Range    GFR If African American >60 >60 mL/min/1.73 m 2    GFR If Non African American >60 >60 mL/min/1.73 m 2     All labs reviewed by me.    RADIOLOGY  DX-LUMBAR SPINE-2 OR 3 VIEWS   Final Result         1.  Anterolisthesis L5 on S1 with probable L5 pars defects.   2.  No acute traumatic bony injury otherwise identified   3.  Atherosclerosis      DX-HIP-COMPLETE - UNILATERAL 2+ RIGHT   Final Result         1.  No radiographic evidence of acute traumatic injury.   2.  Atherosclerosis        The radiologist's interpretation of all radiological studies have been reviewed by me.    COURSE & MEDICAL DECISION MAKING  Pertinent Labs & Imaging studies reviewed.  (See chart for details)    8:10 PM - Review of past medical records shows the patient was last seen by her PCP on the 5th.    8:12 PM - Patient seen and examined at bedside. Patient will be treated with IV fluids for dehydration. Ordered DX-hip, DX-lumbar, Cdiff by PCR, CBC with differential, CMP to evaluate her symptoms. The differential diagnoses include but are not limited to: back injury, hip fracture, contusion, dehydration, electrolyte abnormality, C diff.      10:28 PM - Recheck. Discussed radiology results with the patient and  which indicates no fracture. I found no neurological deficits and will consult with neurosurgery.    10:32 PM - Paged Dr. Shields (Neuro Surgery) to discuss the patient's case.      10:38 PM - Consulted with Dr. Shields (Neuro Surgery) who says it is almost impossible to treat anterior lithiasis as people are not compliant with the back braces and says to treat the pain.     10:42 PM - I updated the family who is in agreement with the treatment care plan as stated above. She will not be admitted at this point. She will be treated for C diff and discharged home with some Zofran.      Medical Decision Making: At this point time I think the patient can be discharged back to her care facility we'll start her on Flagyl for her C. diff. I think this is a cause of her diarrhea. Patient does not have any abdominal pain does not have an elevated white blood cell count does not appear to be overtly dehydrated. She was given a liter of fluids to help with any dehydration. After discussion with the family and neurosurgery the patient would not want surgery even if there was a spondylolisthesis there was acute and traumatic. Neurosurgery thinks this was highly unlikely given just a ground-level fall. Since patient's back pain is very mildly think this is more likely just muscle back pain we'll start her on Tylenol for that.       The patient will return for new or worsening symptoms and is  stable at the time of discharge.    The patient is referred to a primary physician for blood pressure management, diabetic screening, and for all other preventative health concerns.    DISPOSITION:  Patient will be discharged home in stable condition.    FOLLOW UP:  Madisyn Jones, A.P.R.N.  [address] [place] [id number] [phone]    Schedule an appointment as soon as possible for a visit in 2 days        OUTPATIENT MEDICATIONS:  New Prescriptions    ACETAMINOPHEN (TYLENOL) 325 MG TAB    Take 2 Tabs by mouth every 6 hours as needed.    METRONIDAZOLE (FLAGYL) 500 MG TAB    Take 1 Tab by mouth 3 times a day for 14 days.    ONDANSETRON (ZOFRAN ODT) 4 MG TABLET DISPERSIBLE    Take 1 Tab by mouth every 8 hours as needed for Nausea/Vomiting.           FINAL IMPRESSION  1. C. difficile diarrhea    2. Acute midline low back pain without sciatica    3. Fall, initial encounter          Maegan DAVIS (Ernesto), am scribing for, and in the presence of, Iglesia Rico M.D.    Electronically signed by: Maegan Eason (Ernesto), 7/10/2017    Iglesia DAVIS M.D. personally performed the services described in this documentation, as scribed by Maegan Eason in my presence, and it is both accurate and complete.    The note accurately reflects work and decisions made by me.  Iglesia Rico  7/11/2017  12:16 AM

## 2017-07-11 NOTE — ED NOTES
TARAN Atkinson at bedside, updated on patient status, and iso precautions, no questions at this time.     POA requests hospice RN, Jhonatan Gomez to receive information as requested when calling.

## 2017-07-11 NOTE — ED NOTES
Report to [facility] for transport back to assisted living [facility].  Alison Atkinson discharged via gurney with [facility].  Discharge instructions given and reviewed with POA, patient educated to follow up with PCP, verbalized understanding.  Prescriptions given x 3.  All personal belongings in possession.  No questions at this time.

## 2017-07-11 NOTE — ED NOTES
Lab called with + c-diff result, ERP notified, no new orders received, contact precautions remain in place.

## 2017-07-13 NOTE — TELEPHONE ENCOUNTER
Received paperwork from [facility]. Dr Nelson signed and additional information required by Hospice has been printed and faxed to [facility]. Scanned orders in media

## 2017-07-15 ENCOUNTER — TELEPHONE (OUTPATIENT)
Dept: MEDICAL GROUP | Facility: PHYSICIAN GROUP | Age: 82
End: 2017-07-15

## 2017-07-15 NOTE — TELEPHONE ENCOUNTER
Received a fax from [place] has passed away on 7/13/2017.  Letter scanned into chart.